# Patient Record
Sex: MALE | Race: WHITE | NOT HISPANIC OR LATINO | Employment: FULL TIME | URBAN - METROPOLITAN AREA
[De-identification: names, ages, dates, MRNs, and addresses within clinical notes are randomized per-mention and may not be internally consistent; named-entity substitution may affect disease eponyms.]

---

## 2019-07-02 ENCOUNTER — OFFICE VISIT (OUTPATIENT)
Dept: URGENT CARE | Facility: CLINIC | Age: 49
End: 2019-07-02
Payer: COMMERCIAL

## 2019-07-02 VITALS
TEMPERATURE: 98 F | SYSTOLIC BLOOD PRESSURE: 130 MMHG | DIASTOLIC BLOOD PRESSURE: 86 MMHG | HEIGHT: 69 IN | BODY MASS INDEX: 33.33 KG/M2 | HEART RATE: 73 BPM | WEIGHT: 225 LBS | OXYGEN SATURATION: 94 % | RESPIRATION RATE: 18 BRPM

## 2019-07-02 DIAGNOSIS — M54.50 ACUTE MIDLINE LOW BACK PAIN WITHOUT SCIATICA: ICD-10-CM

## 2019-07-02 DIAGNOSIS — S16.1XXA NECK STRAIN, INITIAL ENCOUNTER: Primary | ICD-10-CM

## 2019-07-02 PROCEDURE — 3008F PR BODY MASS INDEX (BMI) DOCUMENTED: ICD-10-PCS | Mod: CPTII,S$GLB,, | Performed by: NURSE PRACTITIONER

## 2019-07-02 PROCEDURE — 99213 OFFICE O/P EST LOW 20 MIN: CPT | Mod: S$GLB,,, | Performed by: NURSE PRACTITIONER

## 2019-07-02 PROCEDURE — 99213 PR OFFICE/OUTPT VISIT, EST, LEVL III, 20-29 MIN: ICD-10-PCS | Mod: S$GLB,,, | Performed by: NURSE PRACTITIONER

## 2019-07-02 PROCEDURE — 3008F BODY MASS INDEX DOCD: CPT | Mod: CPTII,S$GLB,, | Performed by: NURSE PRACTITIONER

## 2019-07-02 RX ORDER — AMLODIPINE AND BENAZEPRIL HYDROCHLORIDE 10; 20 MG/1; MG/1
CAPSULE ORAL
Refills: 2 | COMMUNITY
Start: 2019-06-03 | End: 2023-12-14 | Stop reason: SDUPTHER

## 2019-07-02 RX ORDER — DICLOFENAC SODIUM 75 MG/1
75 TABLET, DELAYED RELEASE ORAL 2 TIMES DAILY WITH MEALS
Qty: 60 TABLET | Refills: 0 | Status: SHIPPED | OUTPATIENT
Start: 2019-07-03 | End: 2020-07-02

## 2019-07-02 RX ORDER — METHOCARBAMOL 500 MG/1
500 TABLET, FILM COATED ORAL 4 TIMES DAILY
Qty: 30 TABLET | Refills: 0 | Status: SHIPPED | OUTPATIENT
Start: 2019-07-02 | End: 2019-07-12

## 2019-07-02 RX ORDER — MUPIROCIN 20 MG/G
OINTMENT TOPICAL
COMMUNITY
Start: 2018-05-07

## 2019-07-02 RX ORDER — CIPROFLOXACIN 500 MG/1
TABLET ORAL
Refills: 1 | COMMUNITY
Start: 2019-04-02

## 2019-07-02 RX ORDER — HYDROCHLOROTHIAZIDE 25 MG/1
TABLET ORAL
COMMUNITY
Start: 2019-04-04 | End: 2023-12-14 | Stop reason: SDUPTHER

## 2019-07-02 NOTE — PROGRESS NOTES
"Subjective:       Patient ID: Miguel Nieves is a 49 y.o. male.    Vitals:  height is 5' 9" (1.753 m) and weight is 102.1 kg (225 lb). His temperature is 97.7 °F (36.5 °C). His blood pressure is 130/86 and his pulse is 73. His respiration is 18 and oxygen saturation is 94% (abnormal).     Chief Complaint: Neck Pain (left)    Pt said he started having left side neck pain when he was talking at work. Back and shoulder pain stared a few days ago after weeding the lawn.     Neck Pain    This is a new problem. The current episode started today. The problem occurs constantly. The problem has been unchanged. The pain is associated with nothing. The pain is present in the left side. The quality of the pain is described as shooting. The pain is at a severity of 0/10. The patient is experiencing no pain. Nothing aggravates the symptoms. Pertinent negatives include no chest pain, fever or headaches. He has tried nothing for the symptoms.       Constitution: Negative for chills, fatigue and fever.   HENT: Negative for congestion and sore throat.    Neck: Positive for neck pain. Negative for painful lymph nodes.   Cardiovascular: Negative for chest pain and leg swelling.   Eyes: Negative for double vision and blurred vision.   Respiratory: Negative for cough and shortness of breath.    Gastrointestinal: Negative for nausea, vomiting and diarrhea.   Genitourinary: Negative for dysuria, frequency and urgency.   Musculoskeletal: Positive for pain. Negative for joint pain, joint swelling, muscle cramps and muscle ache.   Skin: Negative for color change, pale and rash.   Allergic/Immunologic: Negative for seasonal allergies.   Neurological: Negative for dizziness, history of vertigo, light-headedness, passing out and headaches.   Hematologic/Lymphatic: Negative for swollen lymph nodes, easy bruising/bleeding and history of blood clots. Does not bruise/bleed easily.   Psychiatric/Behavioral: Negative for nervous/anxious, sleep " disturbance and depression. The patient is not nervous/anxious.        Objective:      Physical Exam   Constitutional: He is oriented to person, place, and time. He appears well-developed and well-nourished. He is cooperative.  Non-toxic appearance. He does not appear ill. No distress.   HENT:   Head: Normocephalic and atraumatic.   Right Ear: Hearing, tympanic membrane, external ear and ear canal normal.   Left Ear: Hearing, tympanic membrane, external ear and ear canal normal.   Nose: Nose normal. No mucosal edema, rhinorrhea or nasal deformity. No epistaxis. Right sinus exhibits no maxillary sinus tenderness and no frontal sinus tenderness. Left sinus exhibits no maxillary sinus tenderness and no frontal sinus tenderness.   Mouth/Throat: Uvula is midline, oropharynx is clear and moist and mucous membranes are normal. No trismus in the jaw. Normal dentition. No uvula swelling. No posterior oropharyngeal erythema.   Eyes: Conjunctivae and lids are normal. Right eye exhibits no discharge. Left eye exhibits no discharge. No scleral icterus.   Sclera clear bilat   Neck: Trachea normal, normal range of motion, full passive range of motion without pain and phonation normal. Neck supple.   Cardiovascular: Normal rate, regular rhythm, normal heart sounds, intact distal pulses and normal pulses.   Pulmonary/Chest: Effort normal and breath sounds normal. No respiratory distress.   Abdominal: Soft. Normal appearance and bowel sounds are normal. He exhibits no distension, no pulsatile midline mass and no mass. There is no tenderness.   Musculoskeletal: He exhibits no edema or deformity.        Cervical back: He exhibits tenderness, pain and spasm. He exhibits no bony tenderness.        Lumbar back: He exhibits decreased range of motion, tenderness and pain. He exhibits no bony tenderness.   Neurological: He is alert and oriented to person, place, and time. He exhibits normal muscle tone. Coordination normal.   Skin: Skin is  warm, dry and intact. He is not diaphoretic. No pallor.   Psychiatric: He has a normal mood and affect. His speech is normal and behavior is normal. Judgment and thought content normal. Cognition and memory are normal.   Nursing note and vitals reviewed.      Assessment:       1. Neck strain, initial encounter    2. Acute midline low back pain without sciatica        Plan:         Neck strain, initial encounter  -     diclofenac (VOLTAREN) 75 MG EC tablet; Take 1 tablet (75 mg total) by mouth 2 (two) times daily with meals.  Dispense: 60 tablet; Refill: 0  -     methocarbamol (ROBAXIN) 500 MG Tab; Take 1 tablet (500 mg total) by mouth 4 (four) times daily. As needed for muscle spasm. May cause drowsiness for 10 days  Dispense: 30 tablet; Refill: 0    Acute midline low back pain without sciatica  -     diclofenac (VOLTAREN) 75 MG EC tablet; Take 1 tablet (75 mg total) by mouth 2 (two) times daily with meals.  Dispense: 60 tablet; Refill: 0  -     methocarbamol (ROBAXIN) 500 MG Tab; Take 1 tablet (500 mg total) by mouth 4 (four) times daily. As needed for muscle spasm. May cause drowsiness for 10 days  Dispense: 30 tablet; Refill: 0    Please follow up with your Primary care provider within 2-5 days if your signs and symptoms have not resolved or worsen.     If your condition worsens or fails to improve we recommend that you receive another evaluation at the emergency room immediately or contact your primary medical clinic to discuss your concerns.   You must understand that you have received an Urgent Care treatment only and that you may be released before all of your medical problems are known or treated. You, the patient, will arrange for follow up care as instructed.     RED FLAGS/WARNING SYMPTOMS DISCUSSED WITH PATIENT THAT WOULD WARRANT EMERGENT MEDICAL ATTENTION. PATIENT VERBALIZED UNDERSTANDING.       Back Pain (Acute or Chronic)    Back pain is one of the most common problems. The good news is that most  people feel better in 1 to 2 weeks, and most of the rest in 1 to 2 months. Most people can remain active.  People experience and describe pain differently; not everyone is the same.  · The pain can be sharp, stabbing, shooting, aching, cramping or burning.  · Movement, standing, bending, lifting, sitting, or walking may worsen pain.  · It can be localized to one spot or area, or it can be more generalized.  · It can spread or radiate upwards, to the front, or go down your arms or legs (sciatica).  · It can cause muscle spasm.  Most of the time, mechanical problems with the muscles or spine cause the pain. Mechanical problems are usually caused by an injury to the muscles or ligaments. While illness can cause back pain, it is usually not caused by a serious illness. Mechanical problems include:   · Physical activity such as sports, exercise, work, or normal activity  · Overexertion, lifting, pushing, pulling incorrectly or too aggressively  · Sudden twisting, bending, or stretching from an accident, or accidental movement  · Poor posture  · Stretching or moving wrong, without noticing pain at the time  · Poor coordination, lack of regular exercise (check with your doctor about this)  · Spinal disc disease or arthritis  · Stress  Pain can also be related to pregnancy, or illness like appendicitis, bladder or kidney infections, pelvic infections, and many other things.  Acute back pain usually gets better in 1 to 2 weeks. Back pain related to disk disease, arthritis in the spinal joints or spinal stenosis (narrowing of the spinal canal) can become chronic and last for months or years.  Unless you had a physical injury (for example, a car accident or fall) X-rays are usually not needed for the initial evaluation of back pain. If pain continues and does not respond to medical treatment, X-rays and other tests may be needed.  Home care  Try these home care recommendations:  · When in bed, try to find a position of  comfort. A firm mattress is best. Try lying flat on your back with pillows under your knees. You can also try lying on your side with your knees bent up towards your chest and a pillow between your knees.  · At first, do not try to stretch out the sore spots. If there is a strain, it is not like the good soreness you get after exercising without an injury. In this case, stretching may make it worse.  · Avoid prolong sitting, long car rides, or travel. This puts more stress on the lower back than standing or walking.  · During the first 24 to 72 hours after an acute injury or flare up of chronic back pain, apply an ice pack to the painful area for 20 minutes and then remove it for 20 minutes. Do this over a period of 60 to 90 minutes or several times a day. This will reduce swelling and pain. Wrap the ice pack in a thin towel or plastic to protect your skin.  · You can start with ice, then switch to heat. Heat (hot shower, hot bath, or heating pad) reduces pain and works well for muscle spasms. Heat can be applied to the painful area for 20 minutes then remove it for 20 minutes. Do this over a period of 60 to 90 minutes or several times a day. Do not sleep on a heating pad. It can lead to skin burns or tissue damage.  · You can alternate ice and heat therapy. Talk with your doctor about the best treatment for your back pain.  · Therapeutic massage can help relax the back muscles without stretching them.  · Be aware of safe lifting methods and do not lift anything without stretching first.  Medicines  Talk to your doctor before using medicine, especially if you have other medical problems or are taking other medicines.  · You may use over-the-counter medicine as directed on the bottle to control pain, unless another pain medicine was prescribed. If you have chronic conditions like diabetes, liver or kidney disease, stomach ulcers, or gastrointestinal bleeding, or are taking blood thinners, talk to your doctor before  taking any medicine.  · Be careful if you are given a prescription medicines, narcotics, or medicine for muscle spasms. They can cause drowsiness, affect your coordination, reflexes, and judgement. Do not drive or operate heavy machinery.  Follow-up care  Follow up with your healthcare provider, or as advised.   A radiologist will review any X-rays that were taken. Your provide will notify you of any new findings that may affect your care.  Call 911  Call emergency services if any of the following occur:  · Trouble breathing  · Confusion  · Very drowsy or trouble awakening  · Fainting or loss of consciousness  · Rapid or very slow heart rate  · Loss of bowel or bladder control  When to seek medical advice  Call your healthcare provider right away if any of these occur:   · Pain becomes worse or spreads to your legs  · Weakness or numbness in one or both legs  · Numbness in the groin or genital area  Date Last Reviewed: 7/1/2016  © 6042-6062 [employer]. [address] [place] [address]. All rights reserved. This information is not intended as a substitute for professional medical care. Always follow your healthcare professional's instructions.        Understanding Cervical Strain    There are 7 bones (vertebrae) in the neck that are part of the spine. These are called the cervical spine. Cervical strain is a medical term for neck pain. The neck has several layers of muscles. These are connected with tendons to the cervical spine and other bones. Neck pain is often the result of injury to these muscles and tendons.  Causes of cervical strain  Different types of stress on the neck can damage muscles and tendons (soft tissues) and cause cervical strain. Cervical tissues can be damaged by:  · The neck being forced past its normal range of motion, such as in a car accident or sports injury  · Constant, low-level stress, such as from poor posture or a poorly set-up workspace  Symptoms of cervical  strain  These may include:  · Neck pain or stiffness  · Pain in the shoulders or upper back  · Muscle spasms  · Headache, often starting at the base of the neck  · Irritability, difficulty concentrating, or sleeplessness  Treatment for cervical strain  This problem often gets better on its own. Treatments aim to reduce pain and inflammation and increase the range of motion of the neck. Possible treatments include:  · Over-the-counter or prescription pain medicine. These help relieve pain and inflammation.  · Stretching exercises to decrease neck stiffness.  · Massage to decrease neck stiffness.  · Cold or heat pack. These help reduce pain and swelling.  Call 911  Call emergency services right away if you have any of these:  · Face drooping or numbness  · Numbness or weakness, especially in the arms or on one side  · Slurred speech or difficulty speaking  · Blurred vision   When to call your healthcare provider  Call your healthcare provider right away if you have any of these:  · Fever of 100.4°F (38°C) or higher, or as directed  · Pain or stiffness that gets worse  · Symptoms that dont get better, or get worse  · Numbness, tingling, weakness or shooting pains into the arms or legs  · New symptoms  Date Last Reviewed: 3/10/2016  © 3990-6614 [employer]. [address] [place] [address], [place] [address]. All rights reserved. This information is not intended as a substitute for professional medical care. Always follow your healthcare professional's instructions.

## 2019-07-02 NOTE — PATIENT INSTRUCTIONS
Please follow up with your Primary care provider within 2-5 days if your signs and symptoms have not resolved or worsen.     If your condition worsens or fails to improve we recommend that you receive another evaluation at the emergency room immediately or contact your primary medical clinic to discuss your concerns.   You must understand that you have received an Urgent Care treatment only and that you may be released before all of your medical problems are known or treated. You, the patient, will arrange for follow up care as instructed.     RED FLAGS/WARNING SYMPTOMS DISCUSSED WITH PATIENT THAT WOULD WARRANT EMERGENT MEDICAL ATTENTION. PATIENT VERBALIZED UNDERSTANDING.       Back Pain (Acute or Chronic)    Back pain is one of the most common problems. The good news is that most people feel better in 1 to 2 weeks, and most of the rest in 1 to 2 months. Most people can remain active.  People experience and describe pain differently; not everyone is the same.  · The pain can be sharp, stabbing, shooting, aching, cramping or burning.  · Movement, standing, bending, lifting, sitting, or walking may worsen pain.  · It can be localized to one spot or area, or it can be more generalized.  · It can spread or radiate upwards, to the front, or go down your arms or legs (sciatica).  · It can cause muscle spasm.  Most of the time, mechanical problems with the muscles or spine cause the pain. Mechanical problems are usually caused by an injury to the muscles or ligaments. While illness can cause back pain, it is usually not caused by a serious illness. Mechanical problems include:   · Physical activity such as sports, exercise, work, or normal activity  · Overexertion, lifting, pushing, pulling incorrectly or too aggressively  · Sudden twisting, bending, or stretching from an accident, or accidental movement  · Poor posture  · Stretching or moving wrong, without noticing pain at the time  · Poor coordination, lack of regular  exercise (check with your doctor about this)  · Spinal disc disease or arthritis  · Stress  Pain can also be related to pregnancy, or illness like appendicitis, bladder or kidney infections, pelvic infections, and many other things.  Acute back pain usually gets better in 1 to 2 weeks. Back pain related to disk disease, arthritis in the spinal joints or spinal stenosis (narrowing of the spinal canal) can become chronic and last for months or years.  Unless you had a physical injury (for example, a car accident or fall) X-rays are usually not needed for the initial evaluation of back pain. If pain continues and does not respond to medical treatment, X-rays and other tests may be needed.  Home care  Try these home care recommendations:  · When in bed, try to find a position of comfort. A firm mattress is best. Try lying flat on your back with pillows under your knees. You can also try lying on your side with your knees bent up towards your chest and a pillow between your knees.  · At first, do not try to stretch out the sore spots. If there is a strain, it is not like the good soreness you get after exercising without an injury. In this case, stretching may make it worse.  · Avoid prolong sitting, long car rides, or travel. This puts more stress on the lower back than standing or walking.  · During the first 24 to 72 hours after an acute injury or flare up of chronic back pain, apply an ice pack to the painful area for 20 minutes and then remove it for 20 minutes. Do this over a period of 60 to 90 minutes or several times a day. This will reduce swelling and pain. Wrap the ice pack in a thin towel or plastic to protect your skin.  · You can start with ice, then switch to heat. Heat (hot shower, hot bath, or heating pad) reduces pain and works well for muscle spasms. Heat can be applied to the painful area for 20 minutes then remove it for 20 minutes. Do this over a period of 60 to 90 minutes or several times a day. Do  not sleep on a heating pad. It can lead to skin burns or tissue damage.  · You can alternate ice and heat therapy. Talk with your doctor about the best treatment for your back pain.  · Therapeutic massage can help relax the back muscles without stretching them.  · Be aware of safe lifting methods and do not lift anything without stretching first.  Medicines  Talk to your doctor before using medicine, especially if you have other medical problems or are taking other medicines.  · You may use over-the-counter medicine as directed on the bottle to control pain, unless another pain medicine was prescribed. If you have chronic conditions like diabetes, liver or kidney disease, stomach ulcers, or gastrointestinal bleeding, or are taking blood thinners, talk to your doctor before taking any medicine.  · Be careful if you are given a prescription medicines, narcotics, or medicine for muscle spasms. They can cause drowsiness, affect your coordination, reflexes, and judgement. Do not drive or operate heavy machinery.  Follow-up care  Follow up with your healthcare provider, or as advised.   A radiologist will review any X-rays that were taken. Your provide will notify you of any new findings that may affect your care.  Call 911  Call emergency services if any of the following occur:  · Trouble breathing  · Confusion  · Very drowsy or trouble awakening  · Fainting or loss of consciousness  · Rapid or very slow heart rate  · Loss of bowel or bladder control  When to seek medical advice  Call your healthcare provider right away if any of these occur:   · Pain becomes worse or spreads to your legs  · Weakness or numbness in one or both legs  · Numbness in the groin or genital area  Date Last Reviewed: 7/1/2016 © 2000-2017 [employer]. [address] [place] [address], [place] [address]. All rights reserved. This information is not intended as a substitute for professional medical care. Always follow your healthcare  professional's instructions.        Understanding Cervical Strain    There are 7 bones (vertebrae) in the neck that are part of the spine. These are called the cervical spine. Cervical strain is a medical term for neck pain. The neck has several layers of muscles. These are connected with tendons to the cervical spine and other bones. Neck pain is often the result of injury to these muscles and tendons.  Causes of cervical strain  Different types of stress on the neck can damage muscles and tendons (soft tissues) and cause cervical strain. Cervical tissues can be damaged by:  · The neck being forced past its normal range of motion, such as in a car accident or sports injury  · Constant, low-level stress, such as from poor posture or a poorly set-up workspace  Symptoms of cervical strain  These may include:  · Neck pain or stiffness  · Pain in the shoulders or upper back  · Muscle spasms  · Headache, often starting at the base of the neck  · Irritability, difficulty concentrating, or sleeplessness  Treatment for cervical strain  This problem often gets better on its own. Treatments aim to reduce pain and inflammation and increase the range of motion of the neck. Possible treatments include:  · Over-the-counter or prescription pain medicine. These help relieve pain and inflammation.  · Stretching exercises to decrease neck stiffness.  · Massage to decrease neck stiffness.  · Cold or heat pack. These help reduce pain and swelling.  Call 911  Call emergency services right away if you have any of these:  · Face drooping or numbness  · Numbness or weakness, especially in the arms or on one side  · Slurred speech or difficulty speaking  · Blurred vision   When to call your healthcare provider  Call your healthcare provider right away if you have any of these:  · Fever of 100.4°F (38°C) or higher, or as directed  · Pain or stiffness that gets worse  · Symptoms that dont get better, or get worse  · Numbness, tingling,  weakness or shooting pains into the arms or legs  · New symptoms  Date Last Reviewed: 3/10/2016  © 1052-7364 [employer]. [address] [place] [address], [place] [address]. All rights reserved. This information is not intended as a substitute for professional medical care. Always follow your healthcare professional's instructions.

## 2020-08-28 ENCOUNTER — OFFICE VISIT (OUTPATIENT)
Dept: URGENT CARE | Facility: CLINIC | Age: 50
End: 2020-08-28
Payer: COMMERCIAL

## 2020-08-28 VITALS
BODY MASS INDEX: 34.51 KG/M2 | HEART RATE: 94 BPM | WEIGHT: 233 LBS | RESPIRATION RATE: 15 BRPM | HEIGHT: 69 IN | SYSTOLIC BLOOD PRESSURE: 129 MMHG | DIASTOLIC BLOOD PRESSURE: 88 MMHG | TEMPERATURE: 99 F | OXYGEN SATURATION: 97 %

## 2020-08-28 DIAGNOSIS — J02.9 PHARYNGITIS, UNSPECIFIED ETIOLOGY: Primary | ICD-10-CM

## 2020-08-28 DIAGNOSIS — H65.193 ACUTE EFFUSION OF BOTH MIDDLE EARS: ICD-10-CM

## 2020-08-28 DIAGNOSIS — H92.03 OTALGIA OF BOTH EARS: ICD-10-CM

## 2020-08-28 LAB
CTP QC/QA: YES
SARS-COV-2 RDRP RESP QL NAA+PROBE: NEGATIVE

## 2020-08-28 PROCEDURE — 99214 OFFICE O/P EST MOD 30 MIN: CPT | Mod: S$GLB,,, | Performed by: PHYSICIAN ASSISTANT

## 2020-08-28 PROCEDURE — U0002 COVID-19 LAB TEST NON-CDC: HCPCS | Mod: S$GLB,,, | Performed by: PHYSICIAN ASSISTANT

## 2020-08-28 PROCEDURE — U0002: ICD-10-PCS | Mod: S$GLB,,, | Performed by: PHYSICIAN ASSISTANT

## 2020-08-28 PROCEDURE — 99214 PR OFFICE/OUTPT VISIT, EST, LEVL IV, 30-39 MIN: ICD-10-PCS | Mod: S$GLB,,, | Performed by: PHYSICIAN ASSISTANT

## 2020-08-28 RX ORDER — ROSUVASTATIN CALCIUM 10 MG/1
10 TABLET, COATED ORAL
COMMUNITY
Start: 2020-07-10

## 2020-08-28 NOTE — PATIENT INSTRUCTIONS
Self-Care for Sore Throats    Sore throats happen for many reasons, such as colds, allergies, and infections caused by viruses or bacteria. In any case, your throat becomes red and sore. Your goal for self-care is to reduce your discomfort while giving your throat a chance to heal.  Moisten and soothe your throat  Tips include the following:  · Try a sip of water first thing after waking up.  · Keep your throat moist by drinking 6 or more glasses of clear liquids every day.  · Run a cool-air humidifier in your room overnight.  · Avoid cigarette smoke.   · Suck on throat lozenges, cough drops, hard candy, ice chips, or frozen fruit-juice bars. Use the sugar-free versions if your diet or medical condition requires them.  Gargle to ease irritation  Gargling every hour or 2 can ease irritation. Try gargling with 1 of these solutions:  · 1/4 teaspoon of salt in 1/2 cup of warm water  · An over-the-counter anesthetic gargle  Use medicine for more relief  Over-the-counter medicine can reduce sore throat symptoms. Ask your pharmacist if you have questions about which medicine to use:  · Ease pain with anesthetic sprays. Aspirin or an aspirin substitute also helps. Remember, never give aspirin to anyone 18 or younger, or if you are already taking blood thinners.   · For sore throats caused by allergies, try antihistamines to block the allergic reaction.  · Remember: unless a sore throat is caused by a bacterial infection, antibiotics wont help you.  Prevent future sore throats  Prevention tips include the following:  · Stop smoking or reduce contact with secondhand smoke. Smoke irritates the tender throat lining.  · Limit contact with pets and with allergy-causing substances, such as pollen and mold.  · When youre around someone with a sore throat or cold, wash your hands often to keep viruses or bacteria from spreading.  · Dont strain your vocal cords.  Call your healthcare provider  Contact your healthcare provider if  you have:  · A temperature over 101°F (38.3°C)  · White spots on the throat  · Great difficulty swallowing  · Trouble breathing  · A skin rash  · Recent exposure to someone else with strep bacteria  · Severe hoarseness and swollen glands in the neck or jaw   Date Last Reviewed: 8/1/2016 © 2000-2017 [employer]. [address] [place] [address]. All rights reserved. This information is not intended as a substitute for professional medical care. Always follow your healthcare professional's instructions.      Patient Instructions   -Below are suggestions for symptomatic relief:              -Tylenol every 4 hours OR ibuprofen every 6 hours as needed for pain/fever.              -Salt water gargles to soothe throat pain.              -Chloroseptic spray also helps to numb throat pain.              -Nasal saline spray reduces inflammation and dryness.              -Warm face compresses to help with facial sinus pain/pressure.              -Vicks vapor rub at night.                           -Simple foods like chicken noodle soup.              -Delsym helps with coughing at night              -Zyrtec/Claritin during the day & Benadryl at night may help with allergies.                If you DO NOT have Hypertension or any history of palpitations, it is ok to take over the counter Sudafed or Mucinex D or Allegra-D or Claritin-D or Zyrtec-D.  If you do take one of the above, it is ok to combine that with plain over the counter Mucinex or Allegra or Claritin or Zyrtec. If, for example, you are taking Zyrtec -D, you can combine that with Mucinex, but not Mucinex-D.  If you are taking Mucinex-D, you can combine that with plain Allegra or Claritin or Zyrtec.   If you DO have Hypertension or palpitations, it is safe to take Coricidin HBP for relief of sinus symptoms.    Please follow up with your Primary care provider within 2-5 days if your signs and symptoms have not resolved or worsen.     If your  condition worsens or fails to improve we recommend that you receive another evaluation at the emergency room immediately or contact your primary medical clinic to discuss your concerns.   You must understand that you have received an Urgent Care treatment only and that you may be released before all of your medical problems are known or treated. You, the patient, will arrange for follow up care as instructed.     RED FLAGS/WARNING SYMPTOMS DISCUSSED WITH PATIENT THAT WOULD WARRANT EMERGENT MEDICAL ATTENTION. PATIENT VERBALIZED UNDERSTANDING.

## 2020-08-28 NOTE — PROGRESS NOTES
"Subjective:       Patient ID: Miguel Nieves is a 50 y.o. male.    Vitals:  height is 5' 9" (1.753 m) and weight is 105.7 kg (233 lb). His temperature is 99.3 °F (37.4 °C). His blood pressure is 129/88 and his pulse is 94. His respiration is 15 and oxygen saturation is 97%.     Chief Complaint: Otalgia    Patient has known COVID exposure with his wife who was tested 1 week ago.  He states that yesterday he started having pain in his left ear that switch to his right ear and now has mild sore throat.  Denies cough shortness of breath or chest pain.  Denies fever or malaise.  Denies body aches.  Denies loss of sense of taste or smell.    Otalgia   There is pain in both ears. This is a new problem. The current episode started yesterday. The problem occurs constantly. The problem has been unchanged. There has been no fever. The pain is at a severity of 2/10. The pain is mild. Associated symptoms include a sore throat. Pertinent negatives include no abdominal pain, coughing, diarrhea, ear discharge, headaches, hearing loss, neck pain, rash, rhinorrhea or vomiting. He has tried acetaminophen for the symptoms. The treatment provided mild relief.       Constitution: Negative for chills, sweating, fatigue and fever.   HENT: Positive for ear pain and sore throat. Negative for ear discharge, hearing loss, congestion, sinus pain, sinus pressure and voice change.    Neck: Negative for neck pain, neck stiffness and painful lymph nodes.   Eyes: Negative for eye redness.   Respiratory: Negative for chest tightness, cough, sputum production, bloody sputum, COPD, shortness of breath, stridor, wheezing and asthma.    Gastrointestinal: Negative for abdominal pain, nausea, vomiting and diarrhea.   Musculoskeletal: Negative for muscle ache.   Skin: Negative for rash.   Allergic/Immunologic: Negative for seasonal allergies and asthma.   Neurological: Negative for headaches.   Hematologic/Lymphatic: Negative for swollen lymph nodes.     "   Objective:      Physical Exam   Constitutional: He is oriented to person, place, and time. He appears well-developed. He is cooperative.  Non-toxic appearance. He does not appear ill. No distress.   HENT:   Head: Normocephalic and atraumatic.   Ears:   Right Ear: Hearing, external ear and ear canal normal. Tympanic membrane is injected.   Left Ear: Hearing, external ear and ear canal normal. Tympanic membrane is injected.   Nose: Nose normal. No mucosal edema, rhinorrhea or nasal deformity. No epistaxis. Right sinus exhibits no maxillary sinus tenderness and no frontal sinus tenderness. Left sinus exhibits no maxillary sinus tenderness and no frontal sinus tenderness.   Mouth/Throat: Uvula is midline and mucous membranes are normal. No trismus in the jaw. Normal dentition. No uvula swelling. Posterior oropharyngeal erythema present. No oropharyngeal exudate, posterior oropharyngeal edema, tonsillar abscesses or cobblestoning. Tonsils are 1+ on the right. Tonsils are 1+ on the left. No tonsillar exudate.   Eyes: Conjunctivae and lids are normal. No scleral icterus.   Neck: Trachea normal, full passive range of motion without pain and phonation normal. Neck supple. No neck rigidity. No edema and no erythema present.   Cardiovascular: Normal rate, regular rhythm, normal heart sounds and normal pulses.   Pulmonary/Chest: Effort normal and breath sounds normal. No respiratory distress. He has no decreased breath sounds. He has no rhonchi.   Abdominal: Normal appearance.   Musculoskeletal: Normal range of motion.         General: No deformity.   Neurological: He is alert and oriented to person, place, and time. He exhibits normal muscle tone. Coordination normal.   Skin: Skin is warm, dry, intact, not diaphoretic and not pale. Psychiatric: His speech is normal and behavior is normal. Judgment and thought content normal.   Nursing note and vitals reviewed.        Assessment:       1. Pharyngitis, unspecified etiology     2. Otalgia of both ears    3. Acute effusion of both middle ears        Plan:         Pharyngitis, unspecified etiology  -     POCT COVID-19 Rapid Screening    Otalgia of both ears  -     POCT COVID-19 Rapid Screening    Acute effusion of both middle ears     Reviewed lab results with patient. Discussed diagnosis with patient as well as treatment and home care. Discussed return to clinic precautions vs ER precautions. All patients questions answered. Patient verbalized understanding. Patient agreed with plan of care.         Self-Care for Sore Throats    Sore throats happen for many reasons, such as colds, allergies, and infections caused by viruses or bacteria. In any case, your throat becomes red and sore. Your goal for self-care is to reduce your discomfort while giving your throat a chance to heal.  Moisten and soothe your throat  Tips include the following:  · Try a sip of water first thing after waking up.  · Keep your throat moist by drinking 6 or more glasses of clear liquids every day.  · Run a cool-air humidifier in your room overnight.  · Avoid cigarette smoke.   · Suck on throat lozenges, cough drops, hard candy, ice chips, or frozen fruit-juice bars. Use the sugar-free versions if your diet or medical condition requires them.  Gargle to ease irritation  Gargling every hour or 2 can ease irritation. Try gargling with 1 of these solutions:  · 1/4 teaspoon of salt in 1/2 cup of warm water  · An over-the-counter anesthetic gargle  Use medicine for more relief  Over-the-counter medicine can reduce sore throat symptoms. Ask your pharmacist if you have questions about which medicine to use:  · Ease pain with anesthetic sprays. Aspirin or an aspirin substitute also helps. Remember, never give aspirin to anyone 18 or younger, or if you are already taking blood thinners.   · For sore throats caused by allergies, try antihistamines to block the allergic reaction.  · Remember: unless a sore throat is caused by a  bacterial infection, antibiotics wont help you.  Prevent future sore throats  Prevention tips include the following:  · Stop smoking or reduce contact with secondhand smoke. Smoke irritates the tender throat lining.  · Limit contact with pets and with allergy-causing substances, such as pollen and mold.  · When youre around someone with a sore throat or cold, wash your hands often to keep viruses or bacteria from spreading.  · Dont strain your vocal cords.  Call your healthcare provider  Contact your healthcare provider if you have:  · A temperature over 101°F (38.3°C)  · White spots on the throat  · Great difficulty swallowing  · Trouble breathing  · A skin rash  · Recent exposure to someone else with strep bacteria  · Severe hoarseness and swollen glands in the neck or jaw   Date Last Reviewed: 8/1/2016  © 6268-1604 [employer]. [address] [place] [address]. All rights reserved. This information is not intended as a substitute for professional medical care. Always follow your healthcare professional's instructions.      Patient Instructions   -Below are suggestions for symptomatic relief:              -Tylenol every 4 hours OR ibuprofen every 6 hours as needed for pain/fever.              -Salt water gargles to soothe throat pain.              -Chloroseptic spray also helps to numb throat pain.              -Nasal saline spray reduces inflammation and dryness.              -Warm face compresses to help with facial sinus pain/pressure.              -Vicks vapor rub at night.                           -Simple foods like chicken noodle soup.              -Delsym helps with coughing at night              -Zyrtec/Claritin during the day & Benadryl at night may help with allergies.                If you DO NOT have Hypertension or any history of palpitations, it is ok to take over the counter Sudafed or Mucinex D or Allegra-D or Claritin-D or Zyrtec-D.  If you do take one of the above, it  is ok to combine that with plain over the counter Mucinex or Allegra or Claritin or Zyrtec. If, for example, you are taking Zyrtec -D, you can combine that with Mucinex, but not Mucinex-D.  If you are taking Mucinex-D, you can combine that with plain Allegra or Claritin or Zyrtec.   If you DO have Hypertension or palpitations, it is safe to take Coricidin HBP for relief of sinus symptoms.    Please follow up with your Primary care provider within 2-5 days if your signs and symptoms have not resolved or worsen.     If your condition worsens or fails to improve we recommend that you receive another evaluation at the emergency room immediately or contact your primary medical clinic to discuss your concerns.   You must understand that you have received an Urgent Care treatment only and that you may be released before all of your medical problems are known or treated. You, the patient, will arrange for follow up care as instructed.     RED FLAGS/WARNING SYMPTOMS DISCUSSED WITH PATIENT THAT WOULD WARRANT EMERGENT MEDICAL ATTENTION. PATIENT VERBALIZED UNDERSTANDING.

## 2020-11-06 ENCOUNTER — OFFICE VISIT (OUTPATIENT)
Dept: URGENT CARE | Facility: CLINIC | Age: 50
End: 2020-11-06
Payer: COMMERCIAL

## 2020-11-06 VITALS
OXYGEN SATURATION: 97 % | HEART RATE: 84 BPM | TEMPERATURE: 98 F | WEIGHT: 235 LBS | BODY MASS INDEX: 34.8 KG/M2 | HEIGHT: 69 IN | SYSTOLIC BLOOD PRESSURE: 144 MMHG | RESPIRATION RATE: 18 BRPM | DIASTOLIC BLOOD PRESSURE: 97 MMHG

## 2020-11-06 DIAGNOSIS — K21.9 GASTROESOPHAGEAL REFLUX DISEASE, UNSPECIFIED WHETHER ESOPHAGITIS PRESENT: Primary | ICD-10-CM

## 2020-11-06 DIAGNOSIS — R12 HEARTBURN: ICD-10-CM

## 2020-11-06 DIAGNOSIS — R07.9 CHEST PAIN, UNSPECIFIED TYPE: ICD-10-CM

## 2020-11-06 PROCEDURE — 99214 OFFICE O/P EST MOD 30 MIN: CPT | Mod: S$GLB,,, | Performed by: PHYSICIAN ASSISTANT

## 2020-11-06 PROCEDURE — 93005 ELECTROCARDIOGRAM TRACING: CPT | Mod: S$GLB,,, | Performed by: PHYSICIAN ASSISTANT

## 2020-11-06 PROCEDURE — 99214 PR OFFICE/OUTPT VISIT, EST, LEVL IV, 30-39 MIN: ICD-10-PCS | Mod: S$GLB,,, | Performed by: PHYSICIAN ASSISTANT

## 2020-11-06 PROCEDURE — 93010 ELECTROCARDIOGRAM REPORT: CPT | Mod: S$GLB,,, | Performed by: INTERNAL MEDICINE

## 2020-11-06 PROCEDURE — 71046 XR CHEST PA AND LATERAL: ICD-10-PCS | Mod: FY,S$GLB,, | Performed by: RADIOLOGY

## 2020-11-06 PROCEDURE — 71046 X-RAY EXAM CHEST 2 VIEWS: CPT | Mod: FY,S$GLB,, | Performed by: RADIOLOGY

## 2020-11-06 PROCEDURE — 93005 EKG 12-LEAD: ICD-10-PCS | Mod: S$GLB,,, | Performed by: PHYSICIAN ASSISTANT

## 2020-11-06 PROCEDURE — 93010 EKG 12-LEAD: ICD-10-PCS | Mod: S$GLB,,, | Performed by: INTERNAL MEDICINE

## 2020-11-06 RX ORDER — CYCLOBENZAPRINE HCL 10 MG
TABLET ORAL
COMMUNITY
Start: 2020-09-12

## 2020-11-06 RX ORDER — PANTOPRAZOLE SODIUM 40 MG/1
40 TABLET, DELAYED RELEASE ORAL DAILY
Qty: 30 TABLET | Refills: 0 | Status: SHIPPED | OUTPATIENT
Start: 2020-11-06 | End: 2021-11-06

## 2020-11-07 NOTE — PROGRESS NOTES
"Subjective:       Patient ID: Miguel Nieves is a 50 y.o. male.    Vitals:  height is 5' 9" (1.753 m) and weight is 106.6 kg (235 lb). His oral temperature is 97.9 °F (36.6 °C). His blood pressure is 144/97 (abnormal) and his pulse is 84. His respiration is 18 and oxygen saturation is 97%.     Chief Complaint: Chest Pain    Mr. Nieves presents for evaluation of heartburn & chest pain.  He had a pain in his left chest on the lateral side starting around 6:30pm.  It is intermittent & kind of a cramping.  He is also having heartburn & belching.  The pain is not exertional.  He is not having nausea, vomiting, diaphoresis or SOB.  He reports belching several times, which did improve his pain.  He has a history of HTN & HLD, but no cardiac hx.  His father had CAD.  He is not a smoker & has never had a DVT/PE.  He did have cajun rice for lunch today.  He took a ada ASA with no change in symptoms.     Chest Pain   This is a new problem. The current episode started today. The onset quality is gradual. The problem occurs constantly. The problem has been unchanged. The pain is present in the lateral region. The pain is at a severity of 1/10. The pain is mild. Quality: cramp  Pertinent negatives include no abdominal pain, back pain, diaphoresis, dizziness, fever, headaches, nausea, palpitations, shortness of breath, syncope or vomiting. The pain is aggravated by nothing. Treatments tried: ada        Constitution: Negative for chills, sweating, fatigue and fever.   HENT: Negative for ear pain, ear discharge and trouble swallowing.    Neck: Negative for neck pain.   Cardiovascular: Positive for chest pain. Negative for chest trauma, leg swelling, palpitations, sob on exertion and passing out.   Respiratory: Negative for sleep apnea and shortness of breath.    Gastrointestinal: Positive for heartburn. Negative for abdominal pain, abdominal bloating, nausea, vomiting, constipation, diarrhea, bright red blood in stool, dark " colored stools, rectal bleeding, rectal pain and hemorrhoids.   Musculoskeletal: Negative for back pain and pain with walking.   Skin: Negative for rash.   Neurological: Negative for dizziness, history of vertigo, light-headedness, passing out, facial drooping, speech difficulty, coordination disturbances, loss of balance and headaches.   Hematologic/Lymphatic: Negative for history of blood clots.   Psychiatric/Behavioral: Negative for nervous/anxious. The patient is not nervous/anxious.        Objective:      Physical Exam   Constitutional: He is oriented to person, place, and time. He appears well-developed. He is cooperative.  Non-toxic appearance. He does not appear ill. No distress.   HENT:   Head: Normocephalic and atraumatic.   Ears:   Right Ear: Hearing, tympanic membrane, external ear and ear canal normal.   Left Ear: Hearing, tympanic membrane, external ear and ear canal normal.   Nose: Nose normal. No mucosal edema, rhinorrhea or nasal deformity. No epistaxis. Right sinus exhibits no maxillary sinus tenderness and no frontal sinus tenderness. Left sinus exhibits no maxillary sinus tenderness and no frontal sinus tenderness.   Mouth/Throat: Uvula is midline, oropharynx is clear and moist and mucous membranes are normal. No trismus in the jaw. Normal dentition. No uvula swelling. No posterior oropharyngeal erythema.   Eyes: Conjunctivae and lids are normal. Right eye exhibits no discharge. Left eye exhibits no discharge. No scleral icterus.   Neck: Trachea normal, normal range of motion, full passive range of motion without pain and phonation normal. Neck supple.   Cardiovascular: Normal rate, regular rhythm, normal heart sounds and normal pulses.   No murmur heard.   No systolic murmur is present.   No diastolic murmur is present.     Comments: BLE with no edema or TTP.   Pulmonary/Chest: Effort normal and breath sounds normal. No stridor. No respiratory distress. He has no decreased breath sounds. He has  no wheezes. He has no rhonchi. He has no rales.   No chest wall tenderness.     Comments: No chest wall tenderness.     Abdominal: Soft. Normal appearance and bowel sounds are normal. He exhibits no distension, no pulsatile midline mass and no mass. There is no abdominal tenderness.   Musculoskeletal: Normal range of motion.         General: No deformity.      Right lower leg: No edema.      Left lower leg: No edema.   Neurological: He is alert and oriented to person, place, and time. He exhibits normal muscle tone. Coordination normal.   Skin: Skin is warm, dry, intact, not diaphoretic and not pale. Psychiatric: His speech is normal and behavior is normal. Judgment and thought content normal.   Nursing note and vitals reviewed.      EKG - normal EKG with NSR at 76 BPM, no ST or T wave abn  CXR - No acute cardiopulmonary process identified.    Assessment:       1. Gastroesophageal reflux disease, unspecified whether esophagitis present    2. Chest pain, unspecified type    3. Heartburn        Plan:         Gastroesophageal reflux disease, unspecified whether esophagitis present    Chest pain, unspecified type  -     EKG 12-lead  -     XR CHEST PA AND LATERAL; Future; Expected date: 11/06/2020    Heartburn    Other orders  -     (pyxis) gi cocktail (mylanta 30 mL, lidocaine 2 % viscous 10 mL, dicyclomine 10 mL) 50 mL  -     pantoprazole (PROTONIX) 40 MG tablet; Take 1 tablet (40 mg total) by mouth once daily.  Dispense: 30 tablet; Refill: 0    50yoM with PMhx HTN & HLD who presents with a pain in his left chest with heartburn & belching.  EKG shows NSR & CXR clear.  PERC score 0. Clinical exam is WNL.  Patient was given a GI cocktail with relief of symptoms.  Strict ER precautions given.     Diagnoses and plan discussed with the patient, as well as the expected course and duration of his symptoms.  All questions and concerns were addressed prior to discharge.  He was advised to follow up with his PCP within 1 week if  symptoms do not improve.  Emergency department precautions were given.  Patient verbalized understanding and was happy with the plan of care.      Patient Instructions   PLEASE READ YOUR DISCHARGE INSTRUCTIONS ENTIRELY AS IT CONTAINS IMPORTANT INFORMATION.  - Rest.    - Drink plenty of fluids.    - Tylenol or Ibuprofen as directed as needed for fever/pain.    - If you were prescribed antibiotics, please take them to completion.  - If you are female and on birth control pills - please use additional methods of contraception to prevent pregnancy while on antibiotics and for one cycle after.   - If you were prescribed a narcotic medication or muscle relaxer, do not drive or operate heavy equipment or machinery while taking these medications, as they can cause drowsiness.   - If you smoke, please stop smoking.  -You must understand that you've received an Urgent Care treatment only and that you may be released before all your medical problems are known or treated. You, the patient, will    arrange for follow up care as instructed. Please arrange follow up with your primary medical clinic as soon as possible.   - Follow up with your PCP or specialty clinic as directed in the next 1-2 weeks if not improved or as needed.  You can call [phone] to schedule an appointment with the appropriate provider.    - Please return to Urgent Care or to the Emergency Department if your symptoms worsen.    Patient aware and verbalized understanding.    GERD (Adult)    The esophagus is a tube that carries food from the mouth to the stomach. A valve at the lower end of the esophagus prevents stomach acid from flowing upward. When this valve doesn't work properly, stomach contents may repeatedly flow back up (reflux) into the esophagus. This is called gastroesophageal reflux disease (GERD). GERD can irritate the esophagus. It can cause problems with swallowing or breathing. In severe cases, GERD can cause recurrent pneumonia or other  "serious problems.  Symptoms of reflux include burning, pressure or sharp pain in the upper abdomen or mid to lower chest. The pain can spread to the neck, back, or shoulder. There may be belching, an acid taste in the back of the throat, chronic cough, or sore throat or hoarseness. GERD symptoms often occur during the day after a big meal. They can also occur at night when lying down.   Home care  Lifestyle changes can help reduce symptoms. If needed, medicines may be prescribed. Symptoms often improve with treatment, but if treatment is stopped, the symptoms often return after a few months. So most persons with GERD will need to continue treatment.  Lifestyle changes  · Limit or avoid fatty, fried, and spicy foods, as well as coffee, chocolate, mint, and foods with high acid content such as tomatoes and citrus fruit and juices (orange, grapefruit, lemon).  · Dont eat large meals, especially at night. Frequent, smaller meals are best. Do not lie down right after eating. And dont eat anything 3 hours before going to bed.  · Avoid drinking alcohol and smoking. As much as possible, stay away from second hand smoke.  · If you are overweight, losing weight will reduce symptoms.   · Avoid wearing tight clothing around your stomach area.  · If your symptoms occur during sleep, use a foam wedge to elevate your upper body (not just your head.) Or, place 4" blocks under the head of your bed.  Medicines  If needed, medicines can help relieve the symptoms of GERD and prevent damage to the esophagus. Discuss a medicine plan with your healthcare provider. This may include one or more of the following medicines:  · Antacids to help neutralize the normal acids in your stomach.  · Acid blockers (H2 blockers) to decrease acid production.  · Acid inhibitors (PPIs) to decrease acid production in a different way than the blockers. They may work better, but can take a little longer to take effect.  Take an antacid 30-60 minutes after " eating and at bedtime, but not at the same time as an acid blocker.  Try not to take medicines such as ibuprofen and aspirin. If you are taking aspirin for your heart or other medical reasons, talk to your healthcare provider about stopping it.  Follow-up care  Follow up with your healthcare provider or as advised by our staff.  When to seek medical advice  Call your healthcare provider if any of the following occur:  · Stomach pain gets worse or moves to the lower right abdomen (appendix area)  · Chest pain appears or gets worse, or spreads to the back, neck, shoulder, or arm  · Frequent vomiting (cant keep down liquids)  · Blood in the stool or vomit (red or black in color)  · Feeling weak or dizzy  · Fever of 100.4ºF (38ºC) or higher, or as directed by your healthcare provider  Date Last Reviewed: 6/23/2015  © 7048-7579 [email or web address]. [address] [place] [address], [place] [address]. All rights reserved. This information is not intended as a substitute for professional medical care. Always follow your healthcare professional's instructions.

## 2020-11-07 NOTE — PATIENT INSTRUCTIONS
PLEASE READ YOUR DISCHARGE INSTRUCTIONS ENTIRELY AS IT CONTAINS IMPORTANT INFORMATION.  - Rest.    - Drink plenty of fluids.    - Tylenol or Ibuprofen as directed as needed for fever/pain.    - If you were prescribed antibiotics, please take them to completion.  - If you are female and on birth control pills - please use additional methods of contraception to prevent pregnancy while on antibiotics and for one cycle after.   - If you were prescribed a narcotic medication or muscle relaxer, do not drive or operate heavy equipment or machinery while taking these medications, as they can cause drowsiness.   - If you smoke, please stop smoking.  -You must understand that you've received an Urgent Care treatment only and that you may be released before all your medical problems are known or treated. You, the patient, will    arrange for follow up care as instructed. Please arrange follow up with your primary medical clinic as soon as possible.   - Follow up with your PCP or specialty clinic as directed in the next 1-2 weeks if not improved or as needed.  You can call [phone] to schedule an appointment with the appropriate provider.    - Please return to Urgent Care or to the Emergency Department if your symptoms worsen.    Patient aware and verbalized understanding.    GERD (Adult)    The esophagus is a tube that carries food from the mouth to the stomach. A valve at the lower end of the esophagus prevents stomach acid from flowing upward. When this valve doesn't work properly, stomach contents may repeatedly flow back up (reflux) into the esophagus. This is called gastroesophageal reflux disease (GERD). GERD can irritate the esophagus. It can cause problems with swallowing or breathing. In severe cases, GERD can cause recurrent pneumonia or other serious problems.  Symptoms of reflux include burning, pressure or sharp pain in the upper abdomen or mid to lower chest. The pain can spread to the neck, back, or  "shoulder. There may be belching, an acid taste in the back of the throat, chronic cough, or sore throat or hoarseness. GERD symptoms often occur during the day after a big meal. They can also occur at night when lying down.   Home care  Lifestyle changes can help reduce symptoms. If needed, medicines may be prescribed. Symptoms often improve with treatment, but if treatment is stopped, the symptoms often return after a few months. So most persons with GERD will need to continue treatment.  Lifestyle changes  · Limit or avoid fatty, fried, and spicy foods, as well as coffee, chocolate, mint, and foods with high acid content such as tomatoes and citrus fruit and juices (orange, grapefruit, lemon).  · Dont eat large meals, especially at night. Frequent, smaller meals are best. Do not lie down right after eating. And dont eat anything 3 hours before going to bed.  · Avoid drinking alcohol and smoking. As much as possible, stay away from second hand smoke.  · If you are overweight, losing weight will reduce symptoms.   · Avoid wearing tight clothing around your stomach area.  · If your symptoms occur during sleep, use a foam wedge to elevate your upper body (not just your head.) Or, place 4" blocks under the head of your bed.  Medicines  If needed, medicines can help relieve the symptoms of GERD and prevent damage to the esophagus. Discuss a medicine plan with your healthcare provider. This may include one or more of the following medicines:  · Antacids to help neutralize the normal acids in your stomach.  · Acid blockers (H2 blockers) to decrease acid production.  · Acid inhibitors (PPIs) to decrease acid production in a different way than the blockers. They may work better, but can take a little longer to take effect.  Take an antacid 30-60 minutes after eating and at bedtime, but not at the same time as an acid blocker.  Try not to take medicines such as ibuprofen and aspirin. If you are taking aspirin for your " heart or other medical reasons, talk to your healthcare provider about stopping it.  Follow-up care  Follow up with your healthcare provider or as advised by our staff.  When to seek medical advice  Call your healthcare provider if any of the following occur:  · Stomach pain gets worse or moves to the lower right abdomen (appendix area)  · Chest pain appears or gets worse, or spreads to the back, neck, shoulder, or arm  · Frequent vomiting (cant keep down liquids)  · Blood in the stool or vomit (red or black in color)  · Feeling weak or dizzy  · Fever of 100.4ºF (38ºC) or higher, or as directed by your healthcare provider  Date Last Reviewed: 6/23/2015  © 3811-2216 [employer]. [address] [place] [address], [place] [address]. All rights reserved. This information is not intended as a substitute for professional medical care. Always follow your healthcare professional's instructions.

## 2021-01-27 ENCOUNTER — TELEPHONE (OUTPATIENT)
Dept: PEDIATRICS | Facility: CLINIC | Age: 51
End: 2021-01-27

## 2021-01-27 DIAGNOSIS — Z20.822 EXPOSURE TO COVID-19 VIRUS: Primary | ICD-10-CM

## 2021-01-28 ENCOUNTER — LAB VISIT (OUTPATIENT)
Dept: INTERNAL MEDICINE | Facility: CLINIC | Age: 51
End: 2021-01-28
Payer: COMMERCIAL

## 2021-01-28 DIAGNOSIS — Z20.822 EXPOSURE TO COVID-19 VIRUS: Primary | ICD-10-CM

## 2021-01-28 PROCEDURE — U0003 INFECTIOUS AGENT DETECTION BY NUCLEIC ACID (DNA OR RNA); SEVERE ACUTE RESPIRATORY SYNDROME CORONAVIRUS 2 (SARS-COV-2) (CORONAVIRUS DISEASE [COVID-19]), AMPLIFIED PROBE TECHNIQUE, MAKING USE OF HIGH THROUGHPUT TECHNOLOGIES AS DESCRIBED BY CMS-2020-01-R: HCPCS

## 2021-01-29 LAB — SARS-COV-2 RNA RESP QL NAA+PROBE: NOT DETECTED

## 2021-03-26 ENCOUNTER — IMMUNIZATION (OUTPATIENT)
Dept: PRIMARY CARE CLINIC | Facility: CLINIC | Age: 51
End: 2021-03-26
Payer: COMMERCIAL

## 2021-03-26 DIAGNOSIS — Z23 NEED FOR VACCINATION: Primary | ICD-10-CM

## 2021-03-26 PROCEDURE — 91300 PR SARS-COV- 2 COVID-19 VACCINE, NO PRSV, 30MCG/0.3ML, IM: CPT | Mod: S$GLB,,, | Performed by: INTERNAL MEDICINE

## 2021-03-26 PROCEDURE — 91300 PR SARS-COV- 2 COVID-19 VACCINE, NO PRSV, 30MCG/0.3ML, IM: ICD-10-PCS | Mod: S$GLB,,, | Performed by: INTERNAL MEDICINE

## 2021-03-26 PROCEDURE — 0001A PR IMMUNIZ ADMIN, SARS-COV-2 COVID-19 VACC, 30MCG/0.3ML, 1ST DOSE: ICD-10-PCS | Mod: CV19,S$GLB,, | Performed by: INTERNAL MEDICINE

## 2021-03-26 PROCEDURE — 0001A PR IMMUNIZ ADMIN, SARS-COV-2 COVID-19 VACC, 30MCG/0.3ML, 1ST DOSE: CPT | Mod: CV19,S$GLB,, | Performed by: INTERNAL MEDICINE

## 2021-03-26 RX ADMIN — Medication 0.3 ML: at 02:03

## 2021-04-18 ENCOUNTER — IMMUNIZATION (OUTPATIENT)
Dept: PRIMARY CARE CLINIC | Facility: CLINIC | Age: 51
End: 2021-04-18
Payer: COMMERCIAL

## 2021-04-18 DIAGNOSIS — Z23 NEED FOR VACCINATION: Primary | ICD-10-CM

## 2021-04-18 PROCEDURE — 91300 PR SARS-COV- 2 COVID-19 VACCINE, NO PRSV, 30MCG/0.3ML, IM: CPT | Mod: S$GLB,,, | Performed by: INTERNAL MEDICINE

## 2021-04-18 PROCEDURE — 91300 PR SARS-COV- 2 COVID-19 VACCINE, NO PRSV, 30MCG/0.3ML, IM: ICD-10-PCS | Mod: S$GLB,,, | Performed by: INTERNAL MEDICINE

## 2021-04-18 PROCEDURE — 0002A PR IMMUNIZ ADMIN, SARS-COV-2 COVID-19 VACC, 30MCG/0.3ML, 2ND DOSE: ICD-10-PCS | Mod: CV19,S$GLB,, | Performed by: INTERNAL MEDICINE

## 2021-04-18 PROCEDURE — 0002A PR IMMUNIZ ADMIN, SARS-COV-2 COVID-19 VACC, 30MCG/0.3ML, 2ND DOSE: CPT | Mod: CV19,S$GLB,, | Performed by: INTERNAL MEDICINE

## 2021-04-18 RX ADMIN — Medication 0.3 ML: at 09:04

## 2021-07-12 ENCOUNTER — OFFICE VISIT (OUTPATIENT)
Dept: URGENT CARE | Facility: CLINIC | Age: 51
End: 2021-07-12
Payer: COMMERCIAL

## 2021-07-12 VITALS
BODY MASS INDEX: 32.44 KG/M2 | WEIGHT: 219 LBS | OXYGEN SATURATION: 97 % | RESPIRATION RATE: 13 BRPM | HEIGHT: 69 IN | DIASTOLIC BLOOD PRESSURE: 93 MMHG | SYSTOLIC BLOOD PRESSURE: 146 MMHG | TEMPERATURE: 98 F | HEART RATE: 68 BPM

## 2021-07-12 DIAGNOSIS — U07.1 COVID-19 VIRUS DETECTED: ICD-10-CM

## 2021-07-12 DIAGNOSIS — U07.1 COVID-19: ICD-10-CM

## 2021-07-12 DIAGNOSIS — R05.9 COUGH: Primary | ICD-10-CM

## 2021-07-12 LAB
CTP QC/QA: YES
SARS-COV-2 RDRP RESP QL NAA+PROBE: POSITIVE

## 2021-07-12 PROCEDURE — 3008F PR BODY MASS INDEX (BMI) DOCUMENTED: ICD-10-PCS | Mod: CPTII,S$GLB,, | Performed by: NURSE PRACTITIONER

## 2021-07-12 PROCEDURE — U0002: ICD-10-PCS | Mod: QW,CR,S$GLB, | Performed by: NURSE PRACTITIONER

## 2021-07-12 PROCEDURE — 99214 PR OFFICE/OUTPT VISIT, EST, LEVL IV, 30-39 MIN: ICD-10-PCS | Mod: S$GLB,,, | Performed by: NURSE PRACTITIONER

## 2021-07-12 PROCEDURE — 99214 OFFICE O/P EST MOD 30 MIN: CPT | Mod: S$GLB,,, | Performed by: NURSE PRACTITIONER

## 2021-07-12 PROCEDURE — U0002 COVID-19 LAB TEST NON-CDC: HCPCS | Mod: QW,CR,S$GLB, | Performed by: NURSE PRACTITIONER

## 2021-07-12 PROCEDURE — 3008F BODY MASS INDEX DOCD: CPT | Mod: CPTII,S$GLB,, | Performed by: NURSE PRACTITIONER

## 2021-07-13 ENCOUNTER — TELEPHONE (OUTPATIENT)
Dept: URGENT CARE | Facility: CLINIC | Age: 51
End: 2021-07-13

## 2021-10-30 ENCOUNTER — OFFICE VISIT (OUTPATIENT)
Dept: URGENT CARE | Facility: CLINIC | Age: 51
End: 2021-10-30
Payer: COMMERCIAL

## 2021-10-30 VITALS
SYSTOLIC BLOOD PRESSURE: 134 MMHG | OXYGEN SATURATION: 97 % | BODY MASS INDEX: 32.44 KG/M2 | HEART RATE: 78 BPM | WEIGHT: 219 LBS | HEIGHT: 69 IN | RESPIRATION RATE: 18 BRPM | DIASTOLIC BLOOD PRESSURE: 83 MMHG | TEMPERATURE: 99 F

## 2021-10-30 DIAGNOSIS — J00 NASOPHARYNGITIS: ICD-10-CM

## 2021-10-30 DIAGNOSIS — S16.1XXA NECK STRAIN, INITIAL ENCOUNTER: Primary | ICD-10-CM

## 2021-10-30 PROCEDURE — 3079F PR MOST RECENT DIASTOLIC BLOOD PRESSURE 80-89 MM HG: ICD-10-PCS | Mod: CPTII,S$GLB,, | Performed by: NURSE PRACTITIONER

## 2021-10-30 PROCEDURE — 3079F DIAST BP 80-89 MM HG: CPT | Mod: CPTII,S$GLB,, | Performed by: NURSE PRACTITIONER

## 2021-10-30 PROCEDURE — 3075F SYST BP GE 130 - 139MM HG: CPT | Mod: CPTII,S$GLB,, | Performed by: NURSE PRACTITIONER

## 2021-10-30 PROCEDURE — 1160F RVW MEDS BY RX/DR IN RCRD: CPT | Mod: CPTII,S$GLB,, | Performed by: NURSE PRACTITIONER

## 2021-10-30 PROCEDURE — 4010F PR ACE/ARB THEARPY RXD/TAKEN: ICD-10-PCS | Mod: CPTII,S$GLB,, | Performed by: NURSE PRACTITIONER

## 2021-10-30 PROCEDURE — 99213 PR OFFICE/OUTPT VISIT, EST, LEVL III, 20-29 MIN: ICD-10-PCS | Mod: S$GLB,,, | Performed by: NURSE PRACTITIONER

## 2021-10-30 PROCEDURE — 3008F BODY MASS INDEX DOCD: CPT | Mod: CPTII,S$GLB,, | Performed by: NURSE PRACTITIONER

## 2021-10-30 PROCEDURE — 1160F PR REVIEW ALL MEDS BY PRESCRIBER/CLIN PHARMACIST DOCUMENTED: ICD-10-PCS | Mod: CPTII,S$GLB,, | Performed by: NURSE PRACTITIONER

## 2021-10-30 PROCEDURE — 1159F MED LIST DOCD IN RCRD: CPT | Mod: CPTII,S$GLB,, | Performed by: NURSE PRACTITIONER

## 2021-10-30 PROCEDURE — 99213 OFFICE O/P EST LOW 20 MIN: CPT | Mod: S$GLB,,, | Performed by: NURSE PRACTITIONER

## 2021-10-30 PROCEDURE — 4010F ACE/ARB THERAPY RXD/TAKEN: CPT | Mod: CPTII,S$GLB,, | Performed by: NURSE PRACTITIONER

## 2021-10-30 PROCEDURE — 3008F PR BODY MASS INDEX (BMI) DOCUMENTED: ICD-10-PCS | Mod: CPTII,S$GLB,, | Performed by: NURSE PRACTITIONER

## 2021-10-30 PROCEDURE — 1159F PR MEDICATION LIST DOCUMENTED IN MEDICAL RECORD: ICD-10-PCS | Mod: CPTII,S$GLB,, | Performed by: NURSE PRACTITIONER

## 2021-10-30 PROCEDURE — 3075F PR MOST RECENT SYSTOLIC BLOOD PRESS GE 130-139MM HG: ICD-10-PCS | Mod: CPTII,S$GLB,, | Performed by: NURSE PRACTITIONER

## 2021-10-30 RX ORDER — CYCLOBENZAPRINE HCL 10 MG
10 TABLET ORAL NIGHTLY
Qty: 10 TABLET | Refills: 0 | Status: SHIPPED | OUTPATIENT
Start: 2021-10-30 | End: 2021-11-09

## 2021-10-30 RX ORDER — IBUPROFEN 600 MG/1
600 TABLET ORAL EVERY 6 HOURS PRN
Qty: 30 TABLET | Refills: 0 | Status: SHIPPED | OUTPATIENT
Start: 2021-10-30 | End: 2021-11-07

## 2022-02-22 ENCOUNTER — PATIENT MESSAGE (OUTPATIENT)
Dept: RESEARCH | Facility: HOSPITAL | Age: 52
End: 2022-02-22
Payer: COMMERCIAL

## 2022-03-10 ENCOUNTER — OFFICE VISIT (OUTPATIENT)
Dept: URGENT CARE | Facility: CLINIC | Age: 52
End: 2022-03-10
Payer: COMMERCIAL

## 2022-03-10 VITALS
HEART RATE: 77 BPM | WEIGHT: 226 LBS | OXYGEN SATURATION: 97 % | BODY MASS INDEX: 33.47 KG/M2 | RESPIRATION RATE: 16 BRPM | DIASTOLIC BLOOD PRESSURE: 101 MMHG | SYSTOLIC BLOOD PRESSURE: 147 MMHG | HEIGHT: 69 IN | TEMPERATURE: 99 F

## 2022-03-10 DIAGNOSIS — M79.18 MUSCULOSKELETAL PAIN: Primary | ICD-10-CM

## 2022-03-10 DIAGNOSIS — R11.0 NAUSEA: ICD-10-CM

## 2022-03-10 PROCEDURE — 99213 OFFICE O/P EST LOW 20 MIN: CPT | Mod: S$GLB,,, | Performed by: PHYSICIAN ASSISTANT

## 2022-03-10 PROCEDURE — 3080F PR MOST RECENT DIASTOLIC BLOOD PRESSURE >= 90 MM HG: ICD-10-PCS | Mod: CPTII,S$GLB,, | Performed by: PHYSICIAN ASSISTANT

## 2022-03-10 PROCEDURE — 1160F RVW MEDS BY RX/DR IN RCRD: CPT | Mod: CPTII,S$GLB,, | Performed by: PHYSICIAN ASSISTANT

## 2022-03-10 PROCEDURE — 3008F BODY MASS INDEX DOCD: CPT | Mod: CPTII,S$GLB,, | Performed by: PHYSICIAN ASSISTANT

## 2022-03-10 PROCEDURE — 1159F PR MEDICATION LIST DOCUMENTED IN MEDICAL RECORD: ICD-10-PCS | Mod: CPTII,S$GLB,, | Performed by: PHYSICIAN ASSISTANT

## 2022-03-10 PROCEDURE — 3080F DIAST BP >= 90 MM HG: CPT | Mod: CPTII,S$GLB,, | Performed by: PHYSICIAN ASSISTANT

## 2022-03-10 PROCEDURE — 3077F SYST BP >= 140 MM HG: CPT | Mod: CPTII,S$GLB,, | Performed by: PHYSICIAN ASSISTANT

## 2022-03-10 PROCEDURE — 1159F MED LIST DOCD IN RCRD: CPT | Mod: CPTII,S$GLB,, | Performed by: PHYSICIAN ASSISTANT

## 2022-03-10 PROCEDURE — 4010F ACE/ARB THERAPY RXD/TAKEN: CPT | Mod: CPTII,S$GLB,, | Performed by: PHYSICIAN ASSISTANT

## 2022-03-10 PROCEDURE — 3008F PR BODY MASS INDEX (BMI) DOCUMENTED: ICD-10-PCS | Mod: CPTII,S$GLB,, | Performed by: PHYSICIAN ASSISTANT

## 2022-03-10 PROCEDURE — 3077F PR MOST RECENT SYSTOLIC BLOOD PRESSURE >= 140 MM HG: ICD-10-PCS | Mod: CPTII,S$GLB,, | Performed by: PHYSICIAN ASSISTANT

## 2022-03-10 PROCEDURE — 99213 PR OFFICE/OUTPT VISIT, EST, LEVL III, 20-29 MIN: ICD-10-PCS | Mod: S$GLB,,, | Performed by: PHYSICIAN ASSISTANT

## 2022-03-10 PROCEDURE — 4010F PR ACE/ARB THEARPY RXD/TAKEN: ICD-10-PCS | Mod: CPTII,S$GLB,, | Performed by: PHYSICIAN ASSISTANT

## 2022-03-10 PROCEDURE — 1160F PR REVIEW ALL MEDS BY PRESCRIBER/CLIN PHARMACIST DOCUMENTED: ICD-10-PCS | Mod: CPTII,S$GLB,, | Performed by: PHYSICIAN ASSISTANT

## 2022-03-10 RX ORDER — CYCLOBENZAPRINE HCL 10 MG
10 TABLET ORAL 3 TIMES DAILY PRN
Qty: 30 TABLET | Refills: 0 | Status: SHIPPED | OUTPATIENT
Start: 2022-03-10 | End: 2022-03-20

## 2022-03-10 RX ORDER — ONDANSETRON 4 MG/1
4 TABLET, ORALLY DISINTEGRATING ORAL EVERY 8 HOURS PRN
Qty: 15 TABLET | Refills: 0 | Status: SHIPPED | OUTPATIENT
Start: 2022-03-10

## 2022-03-10 NOTE — PROGRESS NOTES
"Subjective:       Patient ID: Miguel Nieves is a 52 y.o. male.    Vitals:  height is 5' 9" (1.753 m) and weight is 102.5 kg (226 lb). His temperature is 98.5 °F (36.9 °C). His blood pressure is 147/101 (abnormal) and his pulse is 77. His respiration is 16 and oxygen saturation is 97%.     Chief Complaint: Emesis    Pt presents to the clinic with a c/o vomiting today. Pt states he got really hot and sweaty and vomited.  Paste that he woke up with some mild neck pain on the right side concerned he may have slept wrong.  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  He gagged but no emesis.    Emesis   This is a new problem. The current episode started today. The problem occurs less than 2 times per day. The problem has been unchanged. The emesis has an appearance of stomach contents. There has been no fever. Associated symptoms include chills and sweats. Associated symptoms comments: Sharp neck pain . He has tried nothing for the symptoms. The treatment provided no relief.       Constitution: Positive for chills.   Gastrointestinal: Positive for vomiting.   Skin: Negative for erythema.       Objective:      Physical Exam   Constitutional: He is oriented to person, place, and time. He appears well-developed. He is cooperative.  Non-toxic appearance. He does not appear ill. No distress.   HENT:   Head: Normocephalic and atraumatic.   Ears:   Right Ear: Hearing, tympanic membrane, external ear and ear canal normal.   Left Ear: Hearing, tympanic membrane, external ear and ear canal normal.   Nose: Nose normal. No mucosal edema, rhinorrhea, nasal deformity or congestion. No epistaxis. Right sinus exhibits no maxillary sinus tenderness and no frontal sinus tenderness. Left sinus exhibits no maxillary sinus tenderness and no frontal sinus tenderness.   Mouth/Throat: Uvula is midline, " oropharynx is clear and moist and mucous membranes are normal. No trismus in the jaw. Normal dentition. No uvula swelling. No oropharyngeal exudate, posterior oropharyngeal edema or posterior oropharyngeal erythema.   Eyes: Conjunctivae and lids are normal. Pupils are equal, round, and reactive to light. Right eye exhibits no discharge. Left eye exhibits no discharge. No scleral icterus.      extraocular movement intact   Neck: Trachea normal and phonation normal. Neck supple.     No edema present. No erythema present. No neck rigidity present.   Cardiovascular: Normal rate, regular rhythm, normal heart sounds and normal pulses.   Pulmonary/Chest: Effort normal and breath sounds normal. No respiratory distress. He has no decreased breath sounds. He has no wheezes. He has no rhonchi.   Abdominal: Normal appearance. Soft.   Musculoskeletal: Normal range of motion.         General: No deformity. Normal range of motion.   Lymphadenopathy:     He has no cervical adenopathy.   Neurological: He is alert and oriented to person, place, and time. He exhibits normal muscle tone. Coordination normal.   Skin: Skin is warm, dry, intact, not diaphoretic and not pale. Capillary refill takes less than 2 seconds. No bruising and No erythema   Psychiatric: His speech is normal and behavior is normal. Judgment and thought content normal.   Nursing note and vitals reviewed.        Assessment:       1. Musculoskeletal pain    2. Nausea          Plan:         Musculoskeletal pain  -     cyclobenzaprine (FLEXERIL) 10 MG tablet; Take 1 tablet (10 mg total) by mouth 3 (three) times daily as needed.  Dispense: 30 tablet; Refill: 0    Nausea  -     ondansetron (ZOFRAN-ODT) 4 MG TbDL; Take 1 tablet (4 mg total) by mouth every 8 (eight) hours as needed (Nausea).  Dispense: 15 tablet; Refill: 0    Follow up if symptoms worsen or fail to improve, for F/U with PCP or ED. There are no Patient Instructions on file for this visit.

## 2022-04-11 ENCOUNTER — OFFICE VISIT (OUTPATIENT)
Dept: URGENT CARE | Facility: CLINIC | Age: 52
End: 2022-04-11
Payer: COMMERCIAL

## 2022-04-11 VITALS
SYSTOLIC BLOOD PRESSURE: 139 MMHG | OXYGEN SATURATION: 96 % | WEIGHT: 226 LBS | BODY MASS INDEX: 33.47 KG/M2 | RESPIRATION RATE: 18 BRPM | HEART RATE: 93 BPM | TEMPERATURE: 99 F | HEIGHT: 69 IN | DIASTOLIC BLOOD PRESSURE: 95 MMHG

## 2022-04-11 DIAGNOSIS — J06.9 URI WITH COUGH AND CONGESTION: Primary | ICD-10-CM

## 2022-04-11 LAB
CTP QC/QA: YES
CTP QC/QA: YES
POC MOLECULAR INFLUENZA A AGN: NEGATIVE
POC MOLECULAR INFLUENZA B AGN: NEGATIVE
SARS-COV-2 RDRP RESP QL NAA+PROBE: NEGATIVE

## 2022-04-11 PROCEDURE — 3008F BODY MASS INDEX DOCD: CPT | Mod: CPTII,S$GLB,, | Performed by: NURSE PRACTITIONER

## 2022-04-11 PROCEDURE — 3075F SYST BP GE 130 - 139MM HG: CPT | Mod: CPTII,S$GLB,, | Performed by: NURSE PRACTITIONER

## 2022-04-11 PROCEDURE — 99213 PR OFFICE/OUTPT VISIT, EST, LEVL III, 20-29 MIN: ICD-10-PCS | Mod: S$GLB,,, | Performed by: NURSE PRACTITIONER

## 2022-04-11 PROCEDURE — U0002 COVID-19 LAB TEST NON-CDC: HCPCS | Mod: QW,S$GLB,, | Performed by: NURSE PRACTITIONER

## 2022-04-11 PROCEDURE — 3075F PR MOST RECENT SYSTOLIC BLOOD PRESS GE 130-139MM HG: ICD-10-PCS | Mod: CPTII,S$GLB,, | Performed by: NURSE PRACTITIONER

## 2022-04-11 PROCEDURE — 71046 X-RAY EXAM CHEST 2 VIEWS: CPT | Mod: FY,S$GLB,, | Performed by: RADIOLOGY

## 2022-04-11 PROCEDURE — 1159F PR MEDICATION LIST DOCUMENTED IN MEDICAL RECORD: ICD-10-PCS | Mod: CPTII,S$GLB,, | Performed by: NURSE PRACTITIONER

## 2022-04-11 PROCEDURE — 99213 OFFICE O/P EST LOW 20 MIN: CPT | Mod: S$GLB,,, | Performed by: NURSE PRACTITIONER

## 2022-04-11 PROCEDURE — 3080F PR MOST RECENT DIASTOLIC BLOOD PRESSURE >= 90 MM HG: ICD-10-PCS | Mod: CPTII,S$GLB,, | Performed by: NURSE PRACTITIONER

## 2022-04-11 PROCEDURE — 87502 POCT INFLUENZA A/B MOLECULAR: ICD-10-PCS | Mod: QW,S$GLB,, | Performed by: NURSE PRACTITIONER

## 2022-04-11 PROCEDURE — 71046 XR CHEST PA AND LATERAL: ICD-10-PCS | Mod: FY,S$GLB,, | Performed by: RADIOLOGY

## 2022-04-11 PROCEDURE — 4010F ACE/ARB THERAPY RXD/TAKEN: CPT | Mod: CPTII,S$GLB,, | Performed by: NURSE PRACTITIONER

## 2022-04-11 PROCEDURE — 3080F DIAST BP >= 90 MM HG: CPT | Mod: CPTII,S$GLB,, | Performed by: NURSE PRACTITIONER

## 2022-04-11 PROCEDURE — U0002: ICD-10-PCS | Mod: QW,S$GLB,, | Performed by: NURSE PRACTITIONER

## 2022-04-11 PROCEDURE — 1160F RVW MEDS BY RX/DR IN RCRD: CPT | Mod: CPTII,S$GLB,, | Performed by: NURSE PRACTITIONER

## 2022-04-11 PROCEDURE — 3008F PR BODY MASS INDEX (BMI) DOCUMENTED: ICD-10-PCS | Mod: CPTII,S$GLB,, | Performed by: NURSE PRACTITIONER

## 2022-04-11 PROCEDURE — 87502 INFLUENZA DNA AMP PROBE: CPT | Mod: QW,S$GLB,, | Performed by: NURSE PRACTITIONER

## 2022-04-11 PROCEDURE — 1160F PR REVIEW ALL MEDS BY PRESCRIBER/CLIN PHARMACIST DOCUMENTED: ICD-10-PCS | Mod: CPTII,S$GLB,, | Performed by: NURSE PRACTITIONER

## 2022-04-11 PROCEDURE — 4010F PR ACE/ARB THEARPY RXD/TAKEN: ICD-10-PCS | Mod: CPTII,S$GLB,, | Performed by: NURSE PRACTITIONER

## 2022-04-11 PROCEDURE — 1159F MED LIST DOCD IN RCRD: CPT | Mod: CPTII,S$GLB,, | Performed by: NURSE PRACTITIONER

## 2022-04-11 RX ORDER — SCOLOPAMINE TRANSDERMAL SYSTEM 1 MG/1
1 PATCH, EXTENDED RELEASE TRANSDERMAL
COMMUNITY
Start: 2022-02-24 | End: 2023-02-24

## 2022-04-11 RX ORDER — TRIAMCINOLONE ACETONIDE 1 MG/G
CREAM TOPICAL
COMMUNITY
Start: 2021-10-20

## 2022-04-11 RX ORDER — BENZONATATE 100 MG/1
100 CAPSULE ORAL 3 TIMES DAILY PRN
Qty: 30 CAPSULE | Refills: 0 | Status: SHIPPED | OUTPATIENT
Start: 2022-04-11 | End: 2022-04-21

## 2022-04-11 RX ORDER — SOD SULF/POT CHLORIDE/MAG SULF 1.479 G
TABLET ORAL
COMMUNITY
Start: 2021-10-29

## 2022-04-11 NOTE — PROGRESS NOTES
"Subjective:       Patient ID: Miguel Nieves is a 52 y.o. male.    Vitals:  height is 5' 9" (1.753 m) and weight is 102.5 kg (226 lb). His oral temperature is 98.5 °F (36.9 °C). His blood pressure is 139/95 (abnormal) and his pulse is 93. His respiration is 18 and oxygen saturation is 96%.     Chief Complaint: Cough    Pt presents to urgent care for dry hacking cough x 3-4 days with an Sore itchy throat & SOB.  Pt has been taking robitusson & inhaler for symptoms  Provider note below:  This is a 52 y.o. male who presents today with a chief complaint of dry cough for 3-4 days with sore throat, patient reports while shortness of breath while coughing, patient reports does have history of bronchitis and takes inhalers as needed, patient reports he started taking Robitussin last night which helped for cough, patient reports he does take medication for his blood pressure but yesterday he ate crawfish, denies fever, body aches or chills, denies  wheezing, denies nausea, vomiting, diarrhea or abdominal pain, denies chest pain or dizziness positional lightheadedness, denies  trouble swallowing, denies loss of taste or smell, or any other symptoms      Cough  This is a new problem. The current episode started in the past 7 days. The problem has been gradually worsening. The problem occurs constantly. The cough is non-productive. Associated symptoms include nasal congestion, postnasal drip, a sore throat and shortness of breath (with cough). Pertinent negatives include no chest pain, chills, fever or heartburn. Nothing aggravates the symptoms. He has tried steroid inhaler and OTC cough suppressant for the symptoms. His past medical history is significant for bronchiectasis.       Constitution: Negative for chills, sweating, fatigue and fever.   HENT: Positive for postnasal drip and sore throat.    Cardiovascular: Negative for chest pain.   Respiratory: Positive for cough, sputum production and shortness of breath (with " cough).    Gastrointestinal: Negative for abdominal pain, nausea, vomiting, constipation, diarrhea and heartburn.   Neurological: Negative for dizziness and light-headedness.       Objective:      Physical Exam   Constitutional: He is oriented to person, place, and time. He appears well-developed. He is cooperative.  Non-toxic appearance. He does not appear ill. No distress.   HENT:   Head: Normocephalic and atraumatic.   Ears:   Right Ear: Hearing, tympanic membrane, external ear and ear canal normal.   Left Ear: Hearing, tympanic membrane, external ear and ear canal normal.   Nose: Nose normal. No mucosal edema, rhinorrhea or nasal deformity. No epistaxis. Right sinus exhibits no maxillary sinus tenderness and no frontal sinus tenderness. Left sinus exhibits no maxillary sinus tenderness and no frontal sinus tenderness.   Mouth/Throat: Uvula is midline, oropharynx is clear and moist and mucous membranes are normal. No trismus in the jaw. Normal dentition. No uvula swelling. No oropharyngeal exudate, posterior oropharyngeal edema, posterior oropharyngeal erythema, tonsillar abscesses or cobblestoning.   Eyes: Conjunctivae and lids are normal. No scleral icterus.   Neck: Trachea normal and phonation normal. Neck supple. No edema present. No erythema present. No neck rigidity present.   Cardiovascular: Normal rate, regular rhythm, normal heart sounds and normal pulses.   Pulmonary/Chest: Effort normal and breath sounds normal. No stridor. No respiratory distress. He has no decreased breath sounds. He has no wheezes. He has no rhonchi. He has no rales.   Abdominal: Normal appearance.   Musculoskeletal: Normal range of motion.         General: No deformity. Normal range of motion.   Neurological: He is alert and oriented to person, place, and time. He exhibits normal muscle tone. Coordination normal.   Skin: Skin is warm, dry, intact, not diaphoretic and not pale.   Psychiatric: His speech is normal and behavior is  normal. Judgment and thought content normal.   Nursing note and vitals reviewed.    Results for orders placed or performed in visit on 04/11/22   POCT COVID-19 Rapid Screening   Result Value Ref Range    POC Rapid COVID Negative Negative     Acceptable Yes    POCT Influenza A/B MOLECULAR   Result Value Ref Range    POC Molecular Influenza A Ag Negative Negative, Not Reported    POC Molecular Influenza B Ag Negative Negative, Not Reported     Acceptable Yes          X-Ray Chest PA And Lateral    Result Date: 4/11/2022  EXAMINATION: XR CHEST PA AND LATERAL CLINICAL HISTORY: Cough, unspecified TECHNIQUE: PA and lateral views of the chest were performed. COMPARISON: 11/06/2020 FINDINGS: The trachea is unremarkable.  The cardiomediastinal silhouette is within normal limits.  The hemidiaphragms are unremarkable.  There are no pleural effusions.  There is no evidence of a pneumothorax.  There is no evidence of pneumomediastinum.  No airspace opacity is present.  There are degenerative changes in the osseous structures.     No acute process. Electronically signed by: Dheeraj Valenzuela MD Date:    04/11/2022 Time:    08:46      Patient in no acute distress.  Vitals reassuring.  Discussed results/diagnosis/plan in depth with patient in clinic. Strict precautions given to patient to monitor for worsening signs and symptoms. Advised to follow up with primary.All questions answered. Strict ER precautions given. If your symptoms worsens or fail to improve you should go to the Emergency Room. Discharge and follow-up instructions given verbally/printed. Discharge and follow-up instructions discussed with the patient who expressed understanding and willingness to comply with my recommendations.Patient voiced understanding and in agreement with current treatment plan.     Please be advised this text was dictated with [employer] software and may contain errors due to translation.      Assessment:       1. URI  with cough and congestion          Plan:         URI with cough and congestion  -     POCT COVID-19 Rapid Screening  -     POCT Influenza A/B MOLECULAR  -     X-Ray Chest PA And Lateral; Future; Expected date: 04/11/2022    Other orders  -     benzonatate (TESSALON) 100 MG capsule; Take 1 capsule (100 mg total) by mouth 3 (three) times daily as needed for Cough.  Dispense: 30 capsule; Refill: 0           Medical Decision Making:   Differential Diagnosis:   Differential diagnoses included but not limited to acute URI, allergic or seasonal rhinitis, viral pharyngitis, bacterial pharyngitis or tonsillitis, tonsillitis, influenza, acute bacterial rhinosinusitis, Bronchitis  Clinical Tests:   Lab Tests: Reviewed  Radiological Study: Ordered and Reviewed  Urgent Care Management:  Patient in no acute distress.  Vitals reassuring.  Negative COVID-19 and flu test results discussed with patient in detail.  Chest x-ray results discussed with patient in depth.  Physical examination, lungs CTA.  Medication prescribed over-the-counter medications discussed with patient in depth. Patient voiced understanding and in agreement with current treatment plan.         Patient Instructions   PLEASE READ YOUR DISCHARGE INSTRUCTIONS ENTIRELY AS IT CONTAINS IMPORTANT INFORMATION.      Please drink plenty of fluids.    Please get plenty of rest.    Please return here or go to the Emergency Department for any concerns or worsening of condition.    Please take an over the counter antihistamine medication (allegra/Claritin/Zyrtec) of your choice as directed.    Try an over the counter decongestant like Mucinex D or Sudafed. You buy this behind the pharmacy counter    If you do have Hypertension or palpitations, it is safe to take Coricidin HBP for relief of sinus symptoms.    If not allergic, please take over the counter Tylenol (Acetaminophen) and/or Motrin (Ibuprofen) as directed for control of pain and/or fever.  Please follow up with your  primary care doctor or specialist as needed.    Sore throat recommendations: Warm fluids, warm salt water gargles, throat lozenges, tea, honey, soup, rest, hydration.    Use over the counter flonase: one spray each nostril twice daily OR two sprays each nostril once daily.     If you  smoke, please stop smoking.      Please return or see your primary care doctor if you develop new or worsening symptoms.     Please arrange follow up with your primary medical clinic as soon as possible. You must understand that you've received an Urgent Care treatment only and that you may be released before all of your medical problems are known or treated. You, the patient, will arrange for follow up as instructed. If your symptoms worsen or fail to improve you should go to the Emergency Room.

## 2022-12-01 ENCOUNTER — OFFICE VISIT (OUTPATIENT)
Dept: URGENT CARE | Facility: CLINIC | Age: 52
End: 2022-12-01
Payer: COMMERCIAL

## 2022-12-01 VITALS
RESPIRATION RATE: 20 BRPM | DIASTOLIC BLOOD PRESSURE: 100 MMHG | HEART RATE: 80 BPM | SYSTOLIC BLOOD PRESSURE: 146 MMHG | BODY MASS INDEX: 33.37 KG/M2 | OXYGEN SATURATION: 98 % | TEMPERATURE: 98 F | WEIGHT: 226 LBS

## 2022-12-01 DIAGNOSIS — J02.9 SORE THROAT: Primary | ICD-10-CM

## 2022-12-01 DIAGNOSIS — J02.9 ACUTE PHARYNGITIS, UNSPECIFIED ETIOLOGY: ICD-10-CM

## 2022-12-01 LAB
CTP QC/QA: YES
CTP QC/QA: YES
MOLECULAR STREP A: NEGATIVE
SARS-COV-2 AG RESP QL IA.RAPID: NEGATIVE

## 2022-12-01 PROCEDURE — 87651 POCT STREP A MOLECULAR: ICD-10-PCS | Mod: QW,S$GLB,, | Performed by: FAMILY MEDICINE

## 2022-12-01 PROCEDURE — 3077F SYST BP >= 140 MM HG: CPT | Mod: CPTII,S$GLB,, | Performed by: FAMILY MEDICINE

## 2022-12-01 PROCEDURE — 4010F PR ACE/ARB THEARPY RXD/TAKEN: ICD-10-PCS | Mod: CPTII,S$GLB,, | Performed by: FAMILY MEDICINE

## 2022-12-01 PROCEDURE — U0002 COVID-19 LAB TEST NON-CDC: HCPCS | Mod: QW,S$GLB,, | Performed by: FAMILY MEDICINE

## 2022-12-01 PROCEDURE — 3080F PR MOST RECENT DIASTOLIC BLOOD PRESSURE >= 90 MM HG: ICD-10-PCS | Mod: CPTII,S$GLB,, | Performed by: FAMILY MEDICINE

## 2022-12-01 PROCEDURE — U0002 SARS CORONAVIRUS 2 ANTIGEN POCT, MANUAL READ: ICD-10-PCS | Mod: QW,S$GLB,, | Performed by: FAMILY MEDICINE

## 2022-12-01 PROCEDURE — 99213 PR OFFICE/OUTPT VISIT, EST, LEVL III, 20-29 MIN: ICD-10-PCS | Mod: S$GLB,,, | Performed by: FAMILY MEDICINE

## 2022-12-01 PROCEDURE — 1159F PR MEDICATION LIST DOCUMENTED IN MEDICAL RECORD: ICD-10-PCS | Mod: CPTII,S$GLB,, | Performed by: FAMILY MEDICINE

## 2022-12-01 PROCEDURE — 1159F MED LIST DOCD IN RCRD: CPT | Mod: CPTII,S$GLB,, | Performed by: FAMILY MEDICINE

## 2022-12-01 PROCEDURE — 3008F PR BODY MASS INDEX (BMI) DOCUMENTED: ICD-10-PCS | Mod: CPTII,S$GLB,, | Performed by: FAMILY MEDICINE

## 2022-12-01 PROCEDURE — 99213 OFFICE O/P EST LOW 20 MIN: CPT | Mod: S$GLB,,, | Performed by: FAMILY MEDICINE

## 2022-12-01 PROCEDURE — 3008F BODY MASS INDEX DOCD: CPT | Mod: CPTII,S$GLB,, | Performed by: FAMILY MEDICINE

## 2022-12-01 PROCEDURE — 3077F PR MOST RECENT SYSTOLIC BLOOD PRESSURE >= 140 MM HG: ICD-10-PCS | Mod: CPTII,S$GLB,, | Performed by: FAMILY MEDICINE

## 2022-12-01 PROCEDURE — 4010F ACE/ARB THERAPY RXD/TAKEN: CPT | Mod: CPTII,S$GLB,, | Performed by: FAMILY MEDICINE

## 2022-12-01 PROCEDURE — 87651 STREP A DNA AMP PROBE: CPT | Mod: QW,S$GLB,, | Performed by: FAMILY MEDICINE

## 2022-12-01 PROCEDURE — 3080F DIAST BP >= 90 MM HG: CPT | Mod: CPTII,S$GLB,, | Performed by: FAMILY MEDICINE

## 2022-12-01 RX ORDER — PREDNISONE 20 MG/1
20 TABLET ORAL DAILY
Qty: 7 TABLET | Refills: 0 | Status: SHIPPED | OUTPATIENT
Start: 2022-12-01 | End: 2022-12-08

## 2022-12-01 RX ORDER — LORATADINE 10 MG/1
10 TABLET ORAL DAILY
Qty: 30 TABLET | Refills: 2 | Status: SHIPPED | OUTPATIENT
Start: 2022-12-01 | End: 2023-12-01

## 2022-12-01 NOTE — PROGRESS NOTES
Subjective:       Patient ID: Miguel Nieves is a 52 y.o. male.    Vitals:  weight is 102.5 kg (226 lb). His temperature is 98.3 °F (36.8 °C). His blood pressure is 146/100 (abnormal) and his pulse is 80. His respiration is 20 and oxygen saturation is 98%.     Chief Complaint: Sore Throat    Pt is complaining of being sick around black Friday. He said he is having trouble swallowing and severe sore throat    States throat feels swollen, and thick mucous, coughing a lot, denies f/c  Exposed to sick family members a weeka go  Covid Vaccinated       Sore Throat   This is a new problem. The current episode started in the past 7 days. The pain is at a severity of 0/10. The patient is experiencing no pain. Associated symptoms include congestion, coughing, swollen glands and trouble swallowing. Pertinent negatives include no diarrhea, drooling, hoarse voice, plugged ear sensation, neck pain, shortness of breath or stridor. Treatments tried: robtussin.     HENT:  Positive for congestion, sore throat and trouble swallowing. Negative for drooling.    Neck: Negative for neck pain.   Respiratory:  Positive for cough. Negative for shortness of breath and stridor.    Gastrointestinal:  Negative for diarrhea.     Objective:      Physical Exam   Constitutional: He is oriented to person, place, and time. He appears well-developed. He is cooperative.  Non-toxic appearance. He does not appear ill. No distress.   HENT:   Head: Normocephalic and atraumatic.   Ears:   Right Ear: Hearing, tympanic membrane, external ear and ear canal normal.   Left Ear: Hearing, tympanic membrane, external ear and ear canal normal.   Nose: Nose normal. No mucosal edema, rhinorrhea or nasal deformity. No epistaxis. Right sinus exhibits no maxillary sinus tenderness and no frontal sinus tenderness. Left sinus exhibits no maxillary sinus tenderness and no frontal sinus tenderness.   Mouth/Throat: Uvula is midline and mucous membranes are normal. Mucous  membranes are moist. No trismus in the jaw. Normal dentition. No uvula swelling. Posterior oropharyngeal erythema present. No oropharyngeal exudate. Oropharynx is clear.      Comments: Enlarged swollen uvula  No exudates    Eyes: Conjunctivae and lids are normal. Pupils are equal, round, and reactive to light. Right eye exhibits no discharge. Left eye exhibits no discharge. No scleral icterus. Extraocular movement intact   Neck: Trachea normal and phonation normal. Neck supple.   Cardiovascular: Normal rate, regular rhythm, normal heart sounds and normal pulses.   Pulmonary/Chest: Effort normal and breath sounds normal. No respiratory distress.   Abdominal: Normal appearance and bowel sounds are normal. He exhibits no distension and no mass. Soft. There is no abdominal tenderness.   Musculoskeletal: Normal range of motion.         General: No deformity. Normal range of motion.   Neurological: He is alert and oriented to person, place, and time. He exhibits normal muscle tone. Coordination normal.   Skin: Skin is warm, dry, intact, not diaphoretic and not pale.   Psychiatric: His speech is normal and behavior is normal. Judgment and thought content normal.   Nursing note and vitals reviewed.      Assessment:       1. Sore throat    2. Acute pharyngitis, unspecified etiology          Plan:         Sore throat  -     POCT Strep A, Molecular  -     SARS Coronavirus 2 Antigen, POCT Manual Read    Acute pharyngitis, unspecified etiology  -     SARS Coronavirus 2 Antigen, POCT Manual Read    Other orders  -     loratadine (CLARITIN) 10 mg tablet; Take 1 tablet (10 mg total) by mouth once daily.  Dispense: 30 tablet; Refill: 2             Results for orders placed or performed in visit on 12/01/22   POCT Strep A, Molecular   Result Value Ref Range    Molecular Strep A, POC Negative Negative     Acceptable Yes            Results for orders placed or performed in visit on 12/01/22   POCT Strep A, Molecular    Result Value Ref Range    Molecular Strep A, POC Negative Negative     Acceptable Yes    SARS Coronavirus 2 Antigen, POCT Manual Read   Result Value Ref Range    SARS Coronavirus 2 Antigen Negative Negative     Acceptable Yes

## 2023-03-01 ENCOUNTER — HOSPITAL ENCOUNTER (OUTPATIENT)
Dept: RADIOLOGY | Facility: OTHER | Age: 53
Discharge: HOME | End: 2023-03-01
Attending: PHYSICIAN ASSISTANT
Payer: COMMERCIAL

## 2023-03-01 DIAGNOSIS — R04.2 COUGHING UP BLOOD: ICD-10-CM

## 2023-03-01 PROCEDURE — 71046 XR CHEST PA AND LATERAL: ICD-10-PCS | Mod: 26,,, | Performed by: RADIOLOGY

## 2023-03-01 PROCEDURE — 71046 X-RAY EXAM CHEST 2 VIEWS: CPT | Mod: TC,FY

## 2023-03-01 PROCEDURE — 71046 X-RAY EXAM CHEST 2 VIEWS: CPT | Mod: 26,,, | Performed by: RADIOLOGY

## 2023-12-14 ENCOUNTER — OFFICE VISIT (OUTPATIENT)
Dept: URGENT CARE | Facility: CLINIC | Age: 53
End: 2023-12-14
Payer: COMMERCIAL

## 2023-12-14 VITALS
OXYGEN SATURATION: 98 % | RESPIRATION RATE: 19 BRPM | DIASTOLIC BLOOD PRESSURE: 101 MMHG | SYSTOLIC BLOOD PRESSURE: 158 MMHG | BODY MASS INDEX: 33.47 KG/M2 | WEIGHT: 226 LBS | TEMPERATURE: 98 F | HEIGHT: 69 IN | HEART RATE: 69 BPM

## 2023-12-14 DIAGNOSIS — K21.00 GASTROESOPHAGEAL REFLUX DISEASE WITH ESOPHAGITIS WITHOUT HEMORRHAGE: Primary | ICD-10-CM

## 2023-12-14 DIAGNOSIS — I10 HYPERTENSION, UNSPECIFIED TYPE: ICD-10-CM

## 2023-12-14 PROCEDURE — 99214 PR OFFICE/OUTPT VISIT, EST, LEVL IV, 30-39 MIN: ICD-10-PCS | Mod: S$GLB,,, | Performed by: FAMILY MEDICINE

## 2023-12-14 PROCEDURE — 99214 OFFICE O/P EST MOD 30 MIN: CPT | Mod: S$GLB,,, | Performed by: FAMILY MEDICINE

## 2023-12-14 RX ORDER — HYDROCHLOROTHIAZIDE 25 MG/1
TABLET ORAL
Qty: 90 TABLET | Refills: 0 | Status: SHIPPED | OUTPATIENT
Start: 2023-12-14

## 2023-12-14 RX ORDER — PANTOPRAZOLE SODIUM 40 MG/1
40 TABLET, DELAYED RELEASE ORAL DAILY
Qty: 30 TABLET | Refills: 1 | Status: SHIPPED | OUTPATIENT
Start: 2023-12-14 | End: 2023-12-14

## 2023-12-14 RX ORDER — PANTOPRAZOLE SODIUM 40 MG/1
40 TABLET, DELAYED RELEASE ORAL
Qty: 90 TABLET | Refills: 1 | Status: SHIPPED | OUTPATIENT
Start: 2023-12-14

## 2023-12-14 RX ORDER — AMLODIPINE AND BENAZEPRIL HYDROCHLORIDE 10; 20 MG/1; MG/1
CAPSULE ORAL
Qty: 90 CAPSULE | Refills: 2 | Status: SHIPPED | OUTPATIENT
Start: 2023-12-14

## 2023-12-14 NOTE — PROGRESS NOTES
"Subjective:      Patient ID: Miguel Nieves is a 53 y.o. male.    Vitals:  height is 5' 9" (1.753 m) and weight is 102.5 kg (225 lb 15.5 oz). His oral temperature is 98.1 °F (36.7 °C). His blood pressure is 164/104 (abnormal) and his pulse is 69. His respiration is 19 and oxygen saturation is 98%.     Chief Complaint: Heartburn    This is a 53 y.o. male who presents today with a chief complaint of heart burn x 3 days   Pt has hx of HTN and ran ouot of meds, awaiting refill of medication  Now c/o having burping sensation, and lump in the throat, and feels reflux and some heart burn    Denies chest pain or SOB      Heartburn  He complains of heartburn. This is a new problem. The current episode started in the past 7 days. The problem occurs occasionally.       Gastrointestinal:  Positive for heartburn.      Objective:     Physical Exam   Constitutional: He is oriented to person, place, and time. He appears well-developed. He is cooperative.  Non-toxic appearance. He does not appear ill. No distress.   HENT:   Head: Normocephalic and atraumatic.   Ears:   Right Ear: Hearing, tympanic membrane, external ear and ear canal normal.   Left Ear: Hearing, tympanic membrane, external ear and ear canal normal.   Nose: Nose normal. No mucosal edema, rhinorrhea or nasal deformity. No epistaxis. Right sinus exhibits no maxillary sinus tenderness and no frontal sinus tenderness. Left sinus exhibits no maxillary sinus tenderness and no frontal sinus tenderness.   Mouth/Throat: Uvula is midline, oropharynx is clear and moist and mucous membranes are normal. Mucous membranes are moist. No trismus in the jaw. Normal dentition. No uvula swelling. No posterior oropharyngeal erythema. Oropharynx is clear.   Eyes: Conjunctivae and lids are normal. Pupils are equal, round, and reactive to light. Right eye exhibits no discharge. Left eye exhibits no discharge. No scleral icterus. Extraocular movement intact   Neck: Trachea normal and " phonation normal. Neck supple.   Cardiovascular: Normal rate, regular rhythm, normal heart sounds and normal pulses.   Pulmonary/Chest: Effort normal and breath sounds normal. No respiratory distress.   Abdominal: Normal appearance and bowel sounds are normal. He exhibits no distension and no mass. Soft. There is no abdominal tenderness.   Musculoskeletal: Normal range of motion.         General: No deformity. Normal range of motion.   Neurological: He is alert and oriented to person, place, and time. He exhibits normal muscle tone. Coordination normal.   Skin: Skin is warm, dry, intact, not diaphoretic and not pale.   Psychiatric: His speech is normal and behavior is normal. Judgment and thought content normal.   Nursing note and vitals reviewed.      Assessment:     1. Gastroesophageal reflux disease with esophagitis without hemorrhage    2. Hypertension, unspecified type        Plan:       Gastroesophageal reflux disease with esophagitis without hemorrhage  -     EKG 12-lead    Hypertension, unspecified type  -     EKG 12-lead    Other orders  -     pantoprazole (PROTONIX) 40 MG tablet; Take 1 tablet (40 mg total) by mouth once daily.  Dispense: 30 tablet; Refill: 1         Advised life style modification    Start BP medication    Follow up with PCP/ Cardiologist           Since EKG with no acute changes  Will Treat him with PPI    FU with his PCP

## 2024-08-30 ENCOUNTER — TELEPHONE (OUTPATIENT)
Dept: UROLOGY | Facility: CLINIC | Age: 54
End: 2024-08-30
Payer: COMMERCIAL

## 2024-10-08 ENCOUNTER — TELEPHONE (OUTPATIENT)
Dept: PRIMARY CARE CLINIC | Facility: CLINIC | Age: 54
End: 2024-10-08
Payer: COMMERCIAL

## 2024-11-27 ENCOUNTER — OFFICE VISIT (OUTPATIENT)
Dept: URGENT CARE | Facility: CLINIC | Age: 54
End: 2024-11-27
Payer: COMMERCIAL

## 2024-11-27 VITALS
RESPIRATION RATE: 20 BRPM | WEIGHT: 226 LBS | HEART RATE: 85 BPM | SYSTOLIC BLOOD PRESSURE: 176 MMHG | BODY MASS INDEX: 33.47 KG/M2 | DIASTOLIC BLOOD PRESSURE: 118 MMHG | OXYGEN SATURATION: 96 % | HEIGHT: 69 IN | TEMPERATURE: 99 F

## 2024-11-27 DIAGNOSIS — H65.193 ACUTE EFFUSION OF BOTH MIDDLE EARS: ICD-10-CM

## 2024-11-27 DIAGNOSIS — R05.9 COUGH, UNSPECIFIED TYPE: ICD-10-CM

## 2024-11-27 DIAGNOSIS — R09.81 NASAL CONGESTION: ICD-10-CM

## 2024-11-27 DIAGNOSIS — R06.2 WHEEZING: ICD-10-CM

## 2024-11-27 DIAGNOSIS — J20.9 ACUTE BRONCHITIS, UNSPECIFIED ORGANISM: Primary | ICD-10-CM

## 2024-11-27 LAB
CTP QC/QA: YES
SARS-COV-2 AG RESP QL IA.RAPID: NEGATIVE

## 2024-11-27 RX ORDER — LORATADINE 10 MG/1
10 TABLET ORAL DAILY
Qty: 30 TABLET | Refills: 0 | Status: SHIPPED | OUTPATIENT
Start: 2024-11-27 | End: 2024-12-27

## 2024-11-27 RX ORDER — FLUTICASONE PROPIONATE 50 MCG
1 SPRAY, SUSPENSION (ML) NASAL 2 TIMES DAILY
Qty: 16 G | Refills: 0 | Status: SHIPPED | OUTPATIENT
Start: 2024-11-27

## 2024-11-27 RX ORDER — ALBUTEROL SULFATE 90 UG/1
1 INHALANT RESPIRATORY (INHALATION) EVERY 4 HOURS PRN
COMMUNITY
Start: 2024-11-18

## 2024-11-27 RX ORDER — PREDNISONE 20 MG/1
20 TABLET ORAL 2 TIMES DAILY
Qty: 10 TABLET | Refills: 0 | Status: SHIPPED | OUTPATIENT
Start: 2024-11-27 | End: 2024-12-02

## 2024-11-27 RX ORDER — BENZONATATE 200 MG/1
200 CAPSULE ORAL 3 TIMES DAILY PRN
Qty: 30 CAPSULE | Refills: 0 | Status: SHIPPED | OUTPATIENT
Start: 2024-11-27 | End: 2024-12-07

## 2024-11-27 NOTE — PROGRESS NOTES
"  Subjective:      Patient ID: Miguel Nieves is a 54 y.o. male.    Vitals:  height is 5' 9" (1.753 m) and weight is 102.5 kg (225 lb 15.5 oz). His oral temperature is 98.6 °F (37 °C). His blood pressure is 176/118 (abnormal) and his pulse is 85. His respiration is 20 and oxygen saturation is 96%.     Chief Complaint: Nasal Congestion (More in the chest, chest congestion. Bronchial   Coughing.  Nasty phlegm    Maybe pneumonia     No fever - Entered by patient) and Cough    54-year-old male presents to the clinic today with chief complaint of productive cough with yellow sputum, nasal congestion, wheezing, chest congestion. Symptoms started 2 weeks ago and have worsened the last 3 days.  Patient has used his albuterol inhaler as needed, otc cough syrup and Mucinex as needed.  He states he was around his sick coworkers.  Denies any recent travel.  Denies history of seasonal allergies. He did have a  hx of asthma.  Denies any pain or radiation of pain. Denies fever, chills, body aches, chest pain, shortness of breath, abdominal pain, nausea, vomiting, diarrhea, or rashes.      Cough  This is a new problem. The current episode started 1 to 4 weeks ago. The problem has been gradually worsening. The problem occurs constantly. The cough is Productive of sputum. Associated symptoms include nasal congestion, postnasal drip and wheezing. Pertinent negatives include no chest pain, chills, ear congestion, ear pain, fever, headaches, myalgias, rash, sore throat or shortness of breath. Nothing aggravates the symptoms. He has tried OTC cough suppressant and OTC inhaler for the symptoms. The treatment provided no relief. His past medical history is significant for environmental allergies. There is no history of asthma, bronchitis or pneumonia.     Constitution: Negative for activity change, chills, sweating, fatigue, fever, generalized weakness and international travel in last 60 days.   HENT:  Positive for congestion and " postnasal drip. Negative for ear pain, sore throat and trouble swallowing.    Neck: Negative for neck pain.   Cardiovascular:  Negative for chest pain.   Eyes:  Negative for eye pain.   Respiratory:  Positive for chest tightness, cough, sputum production, wheezing and asthma. Negative for shortness of breath.    Gastrointestinal:  Negative for abdominal pain, nausea, vomiting and diarrhea.   Genitourinary:  Negative for dysuria.   Musculoskeletal:  Negative for pain and muscle ache.   Skin:  Negative for rash.   Allergic/Immunologic: Positive for environmental allergies and asthma. Negative for seasonal allergies and food allergies.   Neurological:  Negative for dizziness and headaches.   Psychiatric/Behavioral:  Negative for nervous/anxious. The patient is not nervous/anxious.       Objective:     Physical Exam   Constitutional: He is oriented to person, place, and time. He appears well-developed. He is cooperative.  Non-toxic appearance. He does not appear ill. No distress.   HENT:   Head: Normocephalic and atraumatic.   Ears:   Right Ear: Hearing, external ear and ear canal normal. A middle ear effusion is present.   Left Ear: Hearing, external ear and ear canal normal. A middle ear effusion is present.   Nose: Rhinorrhea (turbinates erytheatmous and enlarged) present. No mucosal edema, purulent discharge or nasal deformity. No epistaxis. Right sinus exhibits no maxillary sinus tenderness and no frontal sinus tenderness. Left sinus exhibits no maxillary sinus tenderness and no frontal sinus tenderness.   Mouth/Throat: Uvula is midline, oropharynx is clear and moist and mucous membranes are normal. No trismus in the jaw. Normal dentition. No uvula swelling. Cobblestoning present. No oropharyngeal exudate, posterior oropharyngeal edema, posterior oropharyngeal erythema or tonsillar abscesses. Tonsils are 1+ on the right. Tonsils are 1+ on the left. No tonsillar exudate.   Eyes: Conjunctivae and lids are normal. No  scleral icterus. Extraocular movement intact   Neck: Trachea normal and phonation normal. Neck supple. No edema present. No erythema present. No neck rigidity present.   Cardiovascular: Normal rate, regular rhythm, normal heart sounds and normal pulses.   Pulmonary/Chest: Effort normal. No stridor. No respiratory distress. He has no decreased breath sounds. He has wheezes. He has no rhonchi. He has no rales.   Abdominal: Normal appearance.   Musculoskeletal: Normal range of motion.         General: No deformity. Normal range of motion.   Lymphadenopathy:     He has no cervical adenopathy.   Neurological: He is alert and oriented to person, place, and time. He exhibits normal muscle tone. Coordination normal.   Skin: Skin is warm, dry, intact, not diaphoretic and not pale.   Psychiatric: His speech is normal and behavior is normal. Judgment and thought content normal.   Nursing note and vitals reviewed.      Assessment:     1. Acute bronchitis, unspecified organism    2. Cough, unspecified type    3. Wheezing    4. Nasal congestion    5. Acute effusion of both middle ears      Results for orders placed or performed in visit on 11/27/24   SARS Coronavirus 2 Antigen, POCT Manual Read    Collection Time: 11/27/24  8:36 AM   Result Value Ref Range    SARS Coronavirus 2 Antigen Negative Negative     Acceptable Yes          Plan:       Acute bronchitis, unspecified organism  -     benzonatate (TESSALON) 200 MG capsule; Take 1 capsule (200 mg total) by mouth 3 (three) times daily as needed for Cough.  Dispense: 30 capsule; Refill: 0    Cough, unspecified type  -     SARS Coronavirus 2 Antigen, POCT Manual Read    Wheezing  -     predniSONE (DELTASONE) 20 MG tablet; Take 1 tablet (20 mg total) by mouth 2 (two) times daily. for 5 days  Dispense: 10 tablet; Refill: 0    Nasal congestion  -     fluticasone propionate (FLONASE) 50 mcg/actuation nasal spray; 1 spray (50 mcg total) by Each Nostril route 2 (two)  times daily.  Dispense: 16 g; Refill: 0  -     loratadine (CLARITIN) 10 mg tablet; Take 1 tablet (10 mg total) by mouth once daily.  Dispense: 30 tablet; Refill: 0    Acute effusion of both middle ears  -     predniSONE (DELTASONE) 20 MG tablet; Take 1 tablet (20 mg total) by mouth 2 (two) times daily. for 5 days  Dispense: 10 tablet; Refill: 0  -     fluticasone propionate (FLONASE) 50 mcg/actuation nasal spray; 1 spray (50 mcg total) by Each Nostril route 2 (two) times daily.  Dispense: 16 g; Refill: 0  -     loratadine (CLARITIN) 10 mg tablet; Take 1 tablet (10 mg total) by mouth once daily.  Dispense: 30 tablet; Refill: 0

## 2024-11-27 NOTE — PATIENT INSTRUCTIONS
The CDC recommends should the patient develop a fever or starts to feel worse after they have returned to normal activities, they should return home and away from others for at least another 24 hours.     Please drink plenty of fluids.  Please get plenty of rest.  Please return here or go to the Emergency Department for any concerns or worsening of condition.  If you were prescribed prednisone, please take them to completion.  Recommended using albuterol inhaler at least twice today and then starting tomorrow use as needed every 4-6 hours. Use flonase nasal spray twice daily (use 30 minutes before bedtime at night) and take daily calritin as directed for five-ten days. Use tessalon perles for cough as needed. Recommended taking vitamin D and zinc supplements for immune support.   Recommended for patient to drink hot tea with honey. Consider eating softer foods such as soup and broth for the next couple of days to prevent further throat irritation. Recommended for patient to refrain from acidic foods (such as tomatoes or caffeine) to prevent throat irritation for the next couple of days.   Recommend humidifier, hot showers for sinus drainage. Recommend elevating your head at night with two pillows to prevent post nasal drip and cough at night. Recommend over the counter benadryl allergy plus congestion that includes a nasal decongestant in it if you do not have good results with nasal spray at bed time.   If you do not have Hypertension or any history of palpitations, it is ok to take over the counter Sudafed or Mucinex D or Allegra-D or Claritin-D or Zyrtec-D.  If you do take one of the above, it is ok to combine that with plain over the counter Mucinex or Allegra or Claritin or Zyrtec.  If for example you are taking Zyrtec -D, you can combine that with Mucinex, but not Mucinex-D.  If you are taking Mucinex-D, you can combine that with plain Allegra or Claritin or Zyrtec.   If you do have Hypertension or  palpitations, it is safe to take Coricidin HBP for relief of sinus symptoms.  If not allergic, please take over the counter Tylenol (Acetaminophen) and/or Motrin (Ibuprofen) as directed for control of pain and/or fever.  Please follow up with your primary care doctor or specialist as needed.    If you  smoke, please stop smoking.

## 2025-02-26 ENCOUNTER — TELEPHONE (OUTPATIENT)
Dept: PRIMARY CARE CLINIC | Facility: CLINIC | Age: 55
End: 2025-02-26
Payer: COMMERCIAL

## 2025-02-26 NOTE — TELEPHONE ENCOUNTER
LVM to get pt scheduled to see Dr. Barrios tomorrow at 12 pm per Dr. Barrios. Pt wife stated that the pt informed her it was okay to schedule him at that time. Pt scheduled on 02/27/25 at 12 pm.

## 2025-02-27 ENCOUNTER — OFFICE VISIT (OUTPATIENT)
Dept: PRIMARY CARE CLINIC | Facility: CLINIC | Age: 55
End: 2025-02-27
Payer: COMMERCIAL

## 2025-02-27 ENCOUNTER — LAB VISIT (OUTPATIENT)
Dept: LAB | Facility: HOSPITAL | Age: 55
End: 2025-02-27
Attending: FAMILY MEDICINE
Payer: COMMERCIAL

## 2025-02-27 ENCOUNTER — TELEPHONE (OUTPATIENT)
Dept: PRIMARY CARE CLINIC | Facility: CLINIC | Age: 55
End: 2025-02-27

## 2025-02-27 VITALS
SYSTOLIC BLOOD PRESSURE: 142 MMHG | DIASTOLIC BLOOD PRESSURE: 100 MMHG | WEIGHT: 226.44 LBS | BODY MASS INDEX: 33.54 KG/M2 | HEART RATE: 86 BPM | HEIGHT: 69 IN | OXYGEN SATURATION: 98 %

## 2025-02-27 DIAGNOSIS — Z00.00 ANNUAL PHYSICAL EXAM: ICD-10-CM

## 2025-02-27 DIAGNOSIS — R06.83 SNORING: ICD-10-CM

## 2025-02-27 DIAGNOSIS — E78.49 OTHER HYPERLIPIDEMIA: ICD-10-CM

## 2025-02-27 DIAGNOSIS — Z79.899 ENCOUNTER FOR MONITORING LONG-TERM PROTON PUMP INHIBITOR THERAPY: ICD-10-CM

## 2025-02-27 DIAGNOSIS — E66.09 CLASS 1 OBESITY DUE TO EXCESS CALORIES WITH SERIOUS COMORBIDITY AND BODY MASS INDEX (BMI) OF 33.0 TO 33.9 IN ADULT: ICD-10-CM

## 2025-02-27 DIAGNOSIS — Z86.0100 HISTORY OF COLON POLYPS: ICD-10-CM

## 2025-02-27 DIAGNOSIS — Z86.03 HISTORY OF TRANSURETHRAL RESECTION OF BLADDER TUMOR (TURBT): ICD-10-CM

## 2025-02-27 DIAGNOSIS — Z12.5 ENCOUNTER FOR SCREENING PROSTATE SPECIFIC ANTIGEN (PSA) MEASUREMENT: ICD-10-CM

## 2025-02-27 DIAGNOSIS — I10 ESSENTIAL HYPERTENSION: ICD-10-CM

## 2025-02-27 DIAGNOSIS — J45.909 ASTHMA, UNSPECIFIED ASTHMA SEVERITY, UNSPECIFIED WHETHER COMPLICATED, UNSPECIFIED WHETHER PERSISTENT: ICD-10-CM

## 2025-02-27 DIAGNOSIS — R06.81 WITNESSED EPISODE OF APNEA: ICD-10-CM

## 2025-02-27 DIAGNOSIS — I10 WHITE COAT SYNDROME WITH DIAGNOSIS OF HYPERTENSION: ICD-10-CM

## 2025-02-27 DIAGNOSIS — R80.9 PROTEINURIA, UNSPECIFIED TYPE: ICD-10-CM

## 2025-02-27 DIAGNOSIS — K76.0 FATTY LIVER: ICD-10-CM

## 2025-02-27 DIAGNOSIS — E66.811 CLASS 1 OBESITY DUE TO EXCESS CALORIES WITH SERIOUS COMORBIDITY AND BODY MASS INDEX (BMI) OF 33.0 TO 33.9 IN ADULT: ICD-10-CM

## 2025-02-27 DIAGNOSIS — Z13.6 ENCOUNTER FOR SCREENING FOR CARDIOVASCULAR DISORDERS: ICD-10-CM

## 2025-02-27 DIAGNOSIS — Z98.890 HISTORY OF TRANSURETHRAL RESECTION OF BLADDER TUMOR (TURBT): ICD-10-CM

## 2025-02-27 DIAGNOSIS — Z51.81 ENCOUNTER FOR MONITORING LONG-TERM PROTON PUMP INHIBITOR THERAPY: ICD-10-CM

## 2025-02-27 DIAGNOSIS — Z82.49 FAMILY HISTORY OF HEART DISEASE IN MALE FAMILY MEMBER BEFORE AGE 55: ICD-10-CM

## 2025-02-27 DIAGNOSIS — K21.9 GASTROESOPHAGEAL REFLUX DISEASE, UNSPECIFIED WHETHER ESOPHAGITIS PRESENT: ICD-10-CM

## 2025-02-27 DIAGNOSIS — M54.50 ACUTE BILATERAL LOW BACK PAIN WITHOUT SCIATICA: ICD-10-CM

## 2025-02-27 DIAGNOSIS — N20.0 KIDNEY STONE ON RIGHT SIDE: ICD-10-CM

## 2025-02-27 DIAGNOSIS — T75.3XXA MOTION SICKNESS, INITIAL ENCOUNTER: ICD-10-CM

## 2025-02-27 DIAGNOSIS — Z00.00 ANNUAL PHYSICAL EXAM: Primary | ICD-10-CM

## 2025-02-27 LAB
25(OH)D3+25(OH)D2 SERPL-MCNC: 11 NG/ML (ref 30–96)
ALBUMIN SERPL BCP-MCNC: 4.4 G/DL (ref 3.5–5.2)
ALP SERPL-CCNC: 62 U/L (ref 40–150)
ALT SERPL W/O P-5'-P-CCNC: 38 U/L (ref 10–44)
ANION GAP SERPL CALC-SCNC: 13 MMOL/L (ref 8–16)
AST SERPL-CCNC: 58 U/L (ref 10–40)
BASOPHILS # BLD AUTO: 0.08 K/UL (ref 0–0.2)
BASOPHILS NFR BLD: 0.9 % (ref 0–1.9)
BILIRUB SERPL-MCNC: 1 MG/DL (ref 0.1–1)
BUN SERPL-MCNC: 15 MG/DL (ref 6–20)
CALCIUM SERPL-MCNC: 10.4 MG/DL (ref 8.7–10.5)
CHLORIDE SERPL-SCNC: 102 MMOL/L (ref 95–110)
CHOLEST SERPL-MCNC: 163 MG/DL (ref 120–199)
CHOLEST/HDLC SERPL: 4.1 {RATIO} (ref 2–5)
CO2 SERPL-SCNC: 25 MMOL/L (ref 23–29)
COMPLEXED PSA SERPL-MCNC: 0.93 NG/ML (ref 0–4)
CREAT SERPL-MCNC: 1.2 MG/DL (ref 0.5–1.4)
CREAT UR-MCNC: 51 MG/DL (ref 23–375)
DIFFERENTIAL METHOD BLD: ABNORMAL
EOSINOPHIL # BLD AUTO: 0.3 K/UL (ref 0–0.5)
EOSINOPHIL NFR BLD: 3 % (ref 0–8)
ERYTHROCYTE [DISTWIDTH] IN BLOOD BY AUTOMATED COUNT: 13.4 % (ref 11.5–14.5)
EST. GFR  (NO RACE VARIABLE): >60 ML/MIN/1.73 M^2
ESTIMATED AVG GLUCOSE: 134 MG/DL (ref 68–131)
GLUCOSE SERPL-MCNC: 115 MG/DL (ref 70–110)
HBA1C MFR BLD: 6.3 % (ref 4–5.6)
HCT VFR BLD AUTO: 50.1 % (ref 40–54)
HDLC SERPL-MCNC: 40 MG/DL (ref 40–75)
HDLC SERPL: 24.5 % (ref 20–50)
HGB BLD-MCNC: 16.6 G/DL (ref 14–18)
IMM GRANULOCYTES # BLD AUTO: 0.02 K/UL (ref 0–0.04)
IMM GRANULOCYTES NFR BLD AUTO: 0.2 % (ref 0–0.5)
LDLC SERPL CALC-MCNC: 93.6 MG/DL (ref 63–159)
LYMPHOCYTES # BLD AUTO: 1.3 K/UL (ref 1–4.8)
LYMPHOCYTES NFR BLD: 14.2 % (ref 18–48)
MCH RBC QN AUTO: 27.9 PG (ref 27–31)
MCHC RBC AUTO-ENTMCNC: 33.1 G/DL (ref 32–36)
MCV RBC AUTO: 84 FL (ref 82–98)
MONOCYTES # BLD AUTO: 0.6 K/UL (ref 0.3–1)
MONOCYTES NFR BLD: 6.9 % (ref 4–15)
NEUTROPHILS # BLD AUTO: 6.7 K/UL (ref 1.8–7.7)
NEUTROPHILS NFR BLD: 74.8 % (ref 38–73)
NONHDLC SERPL-MCNC: 123 MG/DL
NRBC BLD-RTO: 0 /100 WBC
OHS QRS DURATION: 104 MS
OHS QTC CALCULATION: 438 MS
PLATELET # BLD AUTO: 319 K/UL (ref 150–450)
PMV BLD AUTO: 9.7 FL (ref 9.2–12.9)
POTASSIUM SERPL-SCNC: 3.7 MMOL/L (ref 3.5–5.1)
PROT SERPL-MCNC: 8.5 G/DL (ref 6–8.4)
PROT UR-MCNC: 51 MG/DL (ref 0–15)
PROT/CREAT UR: 1 MG/G{CREAT} (ref 0–0.2)
RBC # BLD AUTO: 5.94 M/UL (ref 4.6–6.2)
SODIUM SERPL-SCNC: 140 MMOL/L (ref 136–145)
TRIGL SERPL-MCNC: 147 MG/DL (ref 30–150)
TSH SERPL DL<=0.005 MIU/L-ACNC: 0.83 UIU/ML (ref 0.4–4)
WBC # BLD AUTO: 8.9 K/UL (ref 3.9–12.7)

## 2025-02-27 PROCEDURE — 84156 ASSAY OF PROTEIN URINE: CPT | Performed by: FAMILY MEDICINE

## 2025-02-27 PROCEDURE — 84153 ASSAY OF PSA TOTAL: CPT | Performed by: FAMILY MEDICINE

## 2025-02-27 PROCEDURE — 84443 ASSAY THYROID STIM HORMONE: CPT | Performed by: FAMILY MEDICINE

## 2025-02-27 PROCEDURE — 83036 HEMOGLOBIN GLYCOSYLATED A1C: CPT | Performed by: FAMILY MEDICINE

## 2025-02-27 PROCEDURE — 93010 ELECTROCARDIOGRAM REPORT: CPT | Mod: S$GLB,,, | Performed by: INTERNAL MEDICINE

## 2025-02-27 PROCEDURE — 99999 PR PBB SHADOW E&M-EST. PATIENT-LVL V: CPT | Mod: PBBFAC,,, | Performed by: FAMILY MEDICINE

## 2025-02-27 PROCEDURE — 36415 COLL VENOUS BLD VENIPUNCTURE: CPT | Mod: PN | Performed by: FAMILY MEDICINE

## 2025-02-27 PROCEDURE — 80061 LIPID PANEL: CPT | Performed by: FAMILY MEDICINE

## 2025-02-27 PROCEDURE — 93005 ELECTROCARDIOGRAM TRACING: CPT | Mod: S$GLB,,, | Performed by: FAMILY MEDICINE

## 2025-02-27 PROCEDURE — 85025 COMPLETE CBC W/AUTO DIFF WBC: CPT | Performed by: FAMILY MEDICINE

## 2025-02-27 PROCEDURE — 80053 COMPREHEN METABOLIC PANEL: CPT | Performed by: FAMILY MEDICINE

## 2025-02-27 PROCEDURE — 82306 VITAMIN D 25 HYDROXY: CPT | Performed by: FAMILY MEDICINE

## 2025-02-27 RX ORDER — HYDROCHLOROTHIAZIDE 25 MG/1
TABLET ORAL
Qty: 90 TABLET | Refills: 0 | OUTPATIENT
Start: 2025-02-27

## 2025-02-27 RX ORDER — HYDROCHLOROTHIAZIDE 25 MG/1
25 TABLET ORAL DAILY
Qty: 90 TABLET | Refills: 1 | Status: SHIPPED | OUTPATIENT
Start: 2025-02-27

## 2025-02-27 RX ORDER — AMLODIPINE AND BENAZEPRIL HYDROCHLORIDE 10; 20 MG/1; MG/1
1 CAPSULE ORAL
Qty: 90 CAPSULE | Refills: 2 | OUTPATIENT
Start: 2025-02-27

## 2025-02-27 RX ORDER — OMEPRAZOLE 20 MG/1
20 TABLET, DELAYED RELEASE ORAL DAILY
COMMUNITY

## 2025-02-27 RX ORDER — ROSUVASTATIN CALCIUM 10 MG/1
10 TABLET, COATED ORAL DAILY
Qty: 90 TABLET | Refills: 3 | Status: SHIPPED | OUTPATIENT
Start: 2025-02-27

## 2025-02-27 RX ORDER — SCOPOLAMINE 1 MG/3D
1 PATCH, EXTENDED RELEASE TRANSDERMAL
Qty: 4 PATCH | Refills: 0 | Status: SHIPPED | OUTPATIENT
Start: 2025-02-27

## 2025-02-27 RX ORDER — AMLODIPINE AND BENAZEPRIL HYDROCHLORIDE 10; 20 MG/1; MG/1
1 CAPSULE ORAL DAILY
Qty: 90 CAPSULE | Refills: 2 | Status: SHIPPED | OUTPATIENT
Start: 2025-02-27

## 2025-02-27 NOTE — PROGRESS NOTES
"Subjective:       Patient ID: Miguel Nieves is a 55 y.o. male.    Chief Complaint: Establish Care (Pt here to est care/ would like to discuss higher blood pressure/ )    HPI 54 y/o male with HTN, Asthma, HLPD, is here for annual exam and to establish care.     He is feeling good in general, his blood pressure has been running high at home, 130-160's/'s, he notes he has white coat syndrome in addition to the hypertension. He recently started working out 5 days a week with the new year, he is doing cardio and weights.He is trying to eat healthy.     He has been having bilateral lower back pain that radiates to his groin off and on for the past 2 weeks, he thinks it started when he lifted a heavy microwave at work, he has had 2 episodes that last several days, pain is achy and does not radiate, sometimes its just a stiff tight feeling, 2-7/10, worse when bending forward, worse with prolonged standing and better at rest, he denies saddle anesthesia/leg weakness/bowel or bladder incontinence, he took Aleve daily for a few days that helped. No back imaging done. No PT.    He associates gets groin pain with back pain with prostatitis and 2 weeks ago he took 3 days of Cipro just in case    He denies f/n/v, increased belching x 2 weeks, he took a 5 day course of Prilosec OTC which helped, he does get occasional heartburn and takes omeprazole which helps, he is triggered with spicy food and red sauce, he denies dysphagia, but notes he avoids soda as it seem like its hard to go down, he denies d, rare constipation, he denies cp/sob//urinary sx. He snores, he has witnessed apnea per his wife and has been told in the past he had sleep apnea, he is tired often in the morning. Mood is good, he is stressed out when he comes to the doctor.    HTN: amlodipine-benazepril 10-20 mg daily, HCTZ 25 mg daily  Family hx of early CAD: has appt with Cardiology  HLPD: Crestor 10 mg daily  Hematuria s/p hx TURBT, path "Cystitis " "Cystica"/BPH/Prostatitis: following with SHIRLEY Bell, he follows yearly  Proteinuria  Kidney stone: US 2019 R nonobstructine renal stone  Asthma: albuterol prn when he has a cold or in cold weather  Derm following with Dr. Ruiz  GERD: never had EGD, omeprazole prn  [place] [address] care everywhere  Eye exam utd [facility]  Dental utd  Colonoscopy done at [facility]  Dr. Pat, had polyps and was told he was due in 3 years, he is due this summer will request report      Review of Systems    Objective:      BP (!) 142/100 (BP Location: Right arm, Patient Position: Sitting)   Pulse 86   Ht 5' 9" (1.753 m)   Wt 102.7 kg (226 lb 6.6 oz)   SpO2 98%   BMI 33.44 kg/m²   Physical Exam  Vitals and nursing note reviewed.   Constitutional:       Appearance: He is well-developed.   HENT:      Head: Normocephalic and atraumatic.      Mouth/Throat:      Pharynx: No oropharyngeal exudate or posterior oropharyngeal erythema.   Neck:      Thyroid: No thyromegaly.   Cardiovascular:      Rate and Rhythm: Normal rate and regular rhythm.      Heart sounds: Normal heart sounds.   Pulmonary:      Effort: Pulmonary effort is normal. No respiratory distress.      Breath sounds: Normal breath sounds.   Abdominal:      General: Bowel sounds are normal. There is no distension.      Palpations: Abdomen is soft. There is no mass.      Tenderness: There is no abdominal tenderness.   Musculoskeletal:      Cervical back: Normal range of motion and neck supple.      Right lower leg: No edema.      Left lower leg: No edema.   Lymphadenopathy:      Cervical: No cervical adenopathy.   Skin:     General: Skin is warm and dry.   Neurological:      Mental Status: He is alert.         Assessment:       1. Annual physical exam    2. Other hyperlipidemia    3. Essential hypertension    4. Gastroesophageal reflux disease, unspecified whether esophagitis present    5. Asthma, unspecified asthma severity, unspecified whether complicated, " unspecified whether persistent    6. History of transurethral resection of bladder tumor (TURBT)    7. Kidney stone on right side    8. Encounter for monitoring long-term proton pump inhibitor therapy    9. Family history of heart disease in male family member before age 55    10. Snoring    11. Witnessed episode of apnea    12. Class 1 obesity due to excess calories with serious comorbidity and body mass index (BMI) of 33.0 to 33.9 in adult    13. Proteinuria, unspecified type    14. Fatty liver    15. White coat syndrome with diagnosis of hypertension    16. History of colon polyps    17. Encounter for screening for cardiovascular disorders    18. Acute bilateral low back pain without sciatica    19. Encounter for screening prostate specific antigen (PSA) measurement    20. Motion sickness, initial encounter        Plan:   Miguel was seen today for establish care.    Diagnoses and all orders for this visit:    Annual physical exam  -     Protein/Creatinine Ratio, Urine; Future  -     CBC Auto Differential; Future  -     Comprehensive Metabolic Panel; Future  -     Hemoglobin A1C; Future  -     Lipid Panel; Future  -     TSH; Future  -     Vitamin D; Future  -     PSA, Screening; Future    Other hyperlipidemia  -     Lipid Panel; Future  -     rosuvastatin (CRESTOR) 10 MG tablet; Take 1 tablet (10 mg total) by mouth once daily.  -     CT Cardiac Scoring; Future  -     IN OFFICE EKG 12-LEAD (to Muse)    Essential hypertension  -     CBC Auto Differential; Future  -     Comprehensive Metabolic Panel; Future  -     Hemoglobin A1C; Future  -     TSH; Future  -     rosuvastatin (CRESTOR) 10 MG tablet; Take 1 tablet (10 mg total) by mouth once daily.  -     hydroCHLOROthiazide (HYDRODIURIL) 25 MG tablet; Take 1 tablet (25 mg total) by mouth once daily. TAKE 1 TABLET(25 MG) BY MOUTH DAILY  -     amlodipine-benazepril 10-20mg (LOTREL) 10-20 mg per capsule; Take 1 capsule by mouth once daily.  -     CT Cardiac Scoring;  Future  -     IN OFFICE EKG 12-LEAD (to Muse)    Gastroesophageal reflux disease, unspecified whether esophagitis present    Asthma, unspecified asthma severity, unspecified whether complicated, unspecified whether persistent    History of transurethral resection of bladder tumor (TURBT)    Kidney stone on right side    Encounter for monitoring long-term proton pump inhibitor therapy    Family history of heart disease in male family member before age 55  -     CT Cardiac Scoring; Future    Snoring  -     Ambulatory referral/consult to Sleep Disorders; Future    Witnessed episode of apnea  -     Ambulatory referral/consult to Sleep Disorders; Future    Class 1 obesity due to excess calories with serious comorbidity and body mass index (BMI) of 33.0 to 33.9 in adult  -     Vitamin D; Future  -     Ambulatory referral/consult to Hepatology; Future  -     CT Cardiac Scoring; Future  -     IN OFFICE EKG 12-LEAD (to Muse)    Proteinuria, unspecified type  -     Protein/Creatinine Ratio, Urine; Future    Fatty liver  -     Comprehensive Metabolic Panel; Future  -     Hemoglobin A1C; Future  -     Lipid Panel; Future  -     rosuvastatin (CRESTOR) 10 MG tablet; Take 1 tablet (10 mg total) by mouth once daily.  -     Ambulatory referral/consult to Hepatology; Future    White coat syndrome with diagnosis of hypertension    History of colon polyps    Encounter for screening for cardiovascular disorders  -     CT Cardiac Scoring; Future    Acute bilateral low back pain without sciatica  -     X-Ray Lumbar Spine 5 View; Future  -     Ambulatory referral/consult to [facility]; Future    Encounter for screening prostate specific antigen (PSA) measurement  -     PSA, Screening; Future    Motion sickness, initial encounter  -     scopolamine (TRANSDERM-SCOP) 1.3-1.5 mg (1 mg over 3 days); Place 1 patch onto the skin every 72 hours.

## 2025-02-27 NOTE — LETTER
AUTHORIZATION FOR RELEASE OF   CONFIDENTIAL INFORMATION    Dear Dr. Alves,    We are seeing Miguel Nieves, date of birth 1970, in the clinic at [facility]. Mony Barrios MD is the patient's PCP. Miguel Nieves has an outstanding lab/procedure at the time we reviewed his chart. In order to help keep his health information updated, he has authorized us to request the following medical record(s):        (  )  MAMMOGRAM                                      ( X )  COLONOSCOPY      (  )  PAP SMEAR                                          (  )  OUTSIDE LAB RESULTS     (  )  DEXA SCAN                                          (  )  EYE EXAM            (  )  FOOT EXAM                                          ( X )  ENTIRE RECORD     (  )  OUTSIDE IMMUNIZATIONS                 (  )  _______________         Please fax records to [facility], Jones, Rebecca, MD, [phone]     If you have any questions, please contact Rossy at [phone].           Patient Name: Miguel Nieves  : 1970  Patient Phone #: [phone]

## 2025-02-27 NOTE — TELEPHONE ENCOUNTER
----- Message from Suhail sent at 2/27/2025 11:25 AM CST -----  Regarding: Results  Contact: Pt [phone]  2TESTRESULTSType: Test ResultsWhat test was performed? BloodworkWho ordered the test? Dr. Armstrong and where were the test performed? 2/27 [facility] back and or thru [facility]: CallComments:

## 2025-03-10 ENCOUNTER — RESULTS FOLLOW-UP (OUTPATIENT)
Dept: PRIMARY CARE CLINIC | Facility: CLINIC | Age: 55
End: 2025-03-10

## 2025-03-10 ENCOUNTER — OFFICE VISIT (OUTPATIENT)
Dept: HEPATOLOGY | Facility: CLINIC | Age: 55
End: 2025-03-10
Payer: COMMERCIAL

## 2025-03-10 VITALS — HEIGHT: 69 IN | WEIGHT: 231.94 LBS | BODY MASS INDEX: 34.35 KG/M2

## 2025-03-10 DIAGNOSIS — E78.49 OTHER HYPERLIPIDEMIA: ICD-10-CM

## 2025-03-10 DIAGNOSIS — R73.03 PREDIABETES: ICD-10-CM

## 2025-03-10 DIAGNOSIS — I10 ESSENTIAL HYPERTENSION: ICD-10-CM

## 2025-03-10 DIAGNOSIS — R73.03 PREDIABETES: Primary | ICD-10-CM

## 2025-03-10 DIAGNOSIS — R80.9 PROTEINURIA, UNSPECIFIED TYPE: ICD-10-CM

## 2025-03-10 DIAGNOSIS — R74.8 ELEVATED LIVER ENZYMES: ICD-10-CM

## 2025-03-10 DIAGNOSIS — E66.09 CLASS 1 OBESITY DUE TO EXCESS CALORIES WITH SERIOUS COMORBIDITY AND BODY MASS INDEX (BMI) OF 34.0 TO 34.9 IN ADULT: ICD-10-CM

## 2025-03-10 DIAGNOSIS — K76.0 HEPATIC STEATOSIS: Primary | ICD-10-CM

## 2025-03-10 DIAGNOSIS — E66.811 CLASS 1 OBESITY DUE TO EXCESS CALORIES WITH SERIOUS COMORBIDITY AND BODY MASS INDEX (BMI) OF 34.0 TO 34.9 IN ADULT: ICD-10-CM

## 2025-03-10 DIAGNOSIS — E55.9 VITAMIN D DEFICIENCY: ICD-10-CM

## 2025-03-10 PROCEDURE — 99999 PR PBB SHADOW E&M-EST. PATIENT-LVL IV: CPT | Mod: PBBFAC,,,

## 2025-03-10 PROCEDURE — 1159F MED LIST DOCD IN RCRD: CPT | Mod: CPTII,S$GLB,,

## 2025-03-10 PROCEDURE — 3008F BODY MASS INDEX DOCD: CPT | Mod: CPTII,S$GLB,,

## 2025-03-10 PROCEDURE — 1160F RVW MEDS BY RX/DR IN RCRD: CPT | Mod: CPTII,S$GLB,,

## 2025-03-10 PROCEDURE — 99203 OFFICE O/P NEW LOW 30 MIN: CPT | Mod: S$GLB,,,

## 2025-03-10 PROCEDURE — 4010F ACE/ARB THERAPY RXD/TAKEN: CPT | Mod: CPTII,S$GLB,,

## 2025-03-10 PROCEDURE — 3044F HG A1C LEVEL LT 7.0%: CPT | Mod: CPTII,S$GLB,,

## 2025-03-10 NOTE — PATIENT INSTRUCTIONS
1. Fibroscan to look for fat or scar tissue in the liver with return to clinic   2. Recommend the Hep A/B combination vaccine if you have not completed it in the past, see below  3. Labs and US this month  4.  Follow up in 3 months with  fibroscan before    FATTY LIVER:  These things are important to improve fatty liver:  Continue no alcohol use   Weight loss goal of 25-35 lbs. ok to use weight loss medications to help with weight loss from a liver standpoint if you don't have any other contraindications   [facility] offers benefits to patients so let me know if you want a referral to the [facility] gym. Also, [facility] has dieticians that can create a weight loss plan. If you are interested let me know and I can place a referral. If so, contact the dietician team at [facility] at email [email or web address] or call [phone].  to get scheduled. They do offer video visits   Avoid processed foods. No fried/fast foods. No sugary drinks or drinks with any calories.   Low carb/sugar and high protein diet (100 grams per day of protein).Try to limit your carb intake to LESS than  grams per day total. Use [employer] Pal or Lose It leonides to add up your carbs through the day. Do NOT drink any beverages with calories or carbs (this can lead to high blood sugar and weight gain). The main thing to focus on is high protein, low carb)  Exercise, 5 days per week, 30 minutes per day, as tolerated  Recommend good cholesterol, blood pressure, blood sugar levels   There is a new medication called Rezdiffra for the treatment of F2-F3 liver scarring due to fatty liver. It is only indicated for patients with significant scar tissue from fatty liver (will discuss after fibroscan)    In some people, fatty liver can progress to steatohepatitis (inflamatory fatty liver) and possibly to cirrhosis, putting one at increased risk for liver cancer, liver failure, and death. Therefore, the  lifestyle changes are very important to decrease this risk.     Helpful website for [facility]/fatty liver: [email or web address]        HEP A/B VACCINE  The CDC recommends that all adults complete the combination Hepatitis A and B vaccine called TwinRix. This will protect your liver from these viruses, which can make your liver very sick.     Hep A can be transmitted through food and water and can cause significant liver injury. There was previously a significant Hepatitis A outbreak in [place]. Hep B vaccine is transmitted through blood or bodily fluids (there are no symptoms typically) and can develop longstanding (chronic) Hep B and there is no cure, so many people have it for life. Therefore, the vaccines provide immunity against these viruses that can cause harm to the liver.     The vaccine series is 3 vaccines: one now, one at 4 weeks and one 6 months after the 1st one. You can get it from any pharmacy but any [facility] pharmacy typically stocks it and administers it frequently      If the vaccine is not covered at the pharmacy level with your insurance, you may be able to get it with your PCP or in the infectious disease department at [facility] or from your [facility].

## 2025-03-10 NOTE — PROGRESS NOTES
[facility] - New Patient    REFERRING PROVIDER:  Dr. Mony Barrios  PCP: Mony Barrios MD     Chief Complaint:  elevated liver enzymes     HISTORY       HPI: This is a 55 y.o. patient with PMH noted below, presenting for evaluation of fatty liver.      No previous serologic w/u.      Interval HPI: Presents today alone. Reports history of fatty liver for many years. Denies any history of liver staging or bx. Denies every seeing hepatology is the past. States that he feels at his age he needs to start taking better care of himself. Has recently started watching what he was eating more and started working out.     Diet: minimum SSB, mostly water, fast/fried food  Exercise: gym 5 days a week  Supplements: none    Risk factors for fatty liver include:     Obesity - current BMI 34.25  HTN - managed with medication  HLD - rosuvastatin 10 mg  PreDM - last A1C 6.3      LABS & DIAGNOSTIC STUDIES      Labs done 2/2025 show elevated transaminase levels (AST > ALT, elevated x1)  Platelets wnl, alk phos wnl  Synthetic liver functioning wnl    Lab Results   Component Value Date    ALT 38 02/27/2025    AST 58 (H) 02/27/2025    ALKPHOS 62 02/27/2025    BILITOT 1.0 02/27/2025    ALBUMIN 4.4 02/27/2025     02/27/2025         Imaging:  Abd CT done 9/2019 noted  Impression    Irregular wall thickening involving the posterior bladder dome and posterior bladder wall. The appearance is concerning for bladder carcinoma. Recommend correlation with cystoscopy.    A Call Report Document Only message has been documented for BRI AHN in the [employer]  Critical Result system on 9/25/2019 9:42 AM CDT, Message ID [id number]. Discussed with Dr. Ahn at approximately 9:42 AM.    Electronically Signed By: Darlin Helton MD 9/25/2019 9:42 AM CDT  Narrative      CT abdomen and pelvis without contrast.  CT abdomen and  pelvis with contrast.    [id number]    Axial images of the abdomen and pelvis were obtained without  contrast.  Next, axial images of the abdomen and pelvis were obtained following IV contrast administration, split bolus technique. Contrast information: Administered 130.0 ml of 350 mg/ml OMNI using Gauge22 via LeftHand. Oral contrast was not administered. Coronal and sagittal reconstructions were provided. Automated exposure control was utilized to reduce radiation dose to the patient. Total DLP was 989.04 mGy.cm.    COMPARISON: March 30, 2012    INDICATION: Flank pain, stone disease suspected    FINDINGS:  Visualized portions of the inferior thorax demonstrate a thin linear scar in the right middle lobe.    ABDOMEN: The liver is steatotic and enlarged measuring approximately 21 cm in craniocaudal dimension. The gallbladder, pancreas, spleen, and adrenal glands are normal. No radiopaque urinary tract calculus is identified. There is a 2.8 cm left mid renal cyst and a few additional tiny bilateral renal hypodensities which are too small to accurately characterize but likely represent additional cysts. The kidneys excrete contrast symmetrically with no focal filling defect identified within the opacified portions of either collecting system. No abdominal adenopathy.    Pelvis: On unopacified bowel loops demonstrate no CT evidence of high-grade bowel obstruction. The appendix is normal. The posterior bladder dome and posterior bladder wall demonstrate irregular wall thickening. There is minimal stranding in the perivesicular fat. No associated ureterectasis. No pelvic or inguinal adenopathy. The prostate is enlarged measuring approximately 5.5 cm in transverse dimension. There are degenerative changes in the lower thoracic and lumbar spine.      Liver fibrosis staging:  -- fibroscan - will obtain        Denies family history of liver disease.     Denies significant alcohol consumption currently or in the past.     Immunity to Hep A and B - will check with next labs          Allergy and medication list reviewed and  "updated     PMHX:  has a past medical history of Asthma, GERD (gastroesophageal reflux disease), Hyperlipidemia, and Hypertension.    PSHX:  has no past surgical history on file.    FAMILY HISTORY: Updated and reviewed in EPIC    ROS:   GENERAL: Denies fatigue  CARDIOVASCULAR: Denies edema  GI: Denies abdominal pain  SKIN: Denies rash, itching   NEURO: Denies confusion, memory loss, or mood changes    PHYSICAL EXAM:   In no acute distress; alert and oriented to person, place and time  VITALS: Ht 5' 9" (1.753 m)   Wt 105.2 kg (231 lb 14.8 oz)   BMI 34.25 kg/m²   EYES: Sclerae anicteric  GI: Soft, non-tender, non-distended. No ascites.  EXTREMITIES:  No edema.  SKIN: Warm and dry. No jaundice. No telangectasias noted. No palmar erythema.  NEURO:  No asterixis.  PSYCH:  Thought and speech pattern appropriate. Behavior normal        ASSESSMENT & PLAN        EDUCATION:  See instructions discussed with patient in Instructions section of the After Visit Summary     ASSESSMENT & PLAN:  55 y.o. male with:  1.  Fatty liver, likely related to metabolic risk factors Obesity, HTN, HLD, PreDM   - see HPI  -- Immunity to Hep A and B : see HPI  -- no recent abdominal imaging - will get updated US  -- Fibroscan with RTC  -- Recommendations discussed with patient:  Continue no alcohol use  2. Weight loss goal of 25-35 lbs  3. Low carb/sugar, high fiber and protein diet, limit your carb intake to LESS than 30-45 grams of carbs with a meal or LESS than 5-10 grams with any snack   4. Exercise, 5 days per week, 30 minutes per day, as tolerated  5. Recommend good cholesterol, blood pressure, blood sugar levels   6. There is a new medication called Rezdiffra for the treatment of F2-F3 liver scarring due to fatty liver. It is only indicated for those with stage 2 or 3 scar tissue (will discuss after fibroscan)    2. Elevated liver enzymes  -- will trend labs - if remain elevated will complete full sero work up    3. Obesity, HTN, HLD, " PreDM   -- Body mass index is 34.25 kg/m².   -- increases risk for fatty liver        Follow up in about 3 months (around 6/10/2025). with labs, US, and fibroscan before  Orders Placed This Encounter   Procedures    FibroScan Transplant Hepatology(Vibration Controlled Transient Elastography)    US Abdomen Complete    Hepatic Function Panel    Hepatitis Panel, Acute    CK    Phosphatidylethanol (PETH)        Thank you for allowing me to participate in the care of Miguel KEVYN Quiroz, FNP-C  Nurse Practitioner  [facility]     I spent a total of 30 minutes on the day of the visit.This includes face to face time and non-face to face time preparing to see the patient (eg, review of tests), obtaining and/or reviewing separately obtained history, documenting clinical information in the electronic or other health record, independently interpreting results and communicating results to the patient/family/caregiver, and coordinating care.         CC'ed note to:   Mony Barrios MD

## 2025-03-11 RX ORDER — METFORMIN HYDROCHLORIDE 500 MG/1
500 TABLET ORAL 2 TIMES DAILY WITH MEALS
Qty: 180 TABLET | Refills: 3 | Status: SHIPPED | OUTPATIENT
Start: 2025-03-11 | End: 2026-03-11

## 2025-03-11 RX ORDER — ACETAMINOPHEN 500 MG
2000 TABLET ORAL DAILY
Qty: 90 CAPSULE | Refills: 3 | Status: SHIPPED | OUTPATIENT
Start: 2025-03-11

## 2025-03-11 NOTE — TELEPHONE ENCOUNTER
I sent in metformin and vitamin-D to the pharmacy, please set him up for a virtual in 6 weeks to see how he is doing on the metformin and make a dose adjustment etc.

## 2025-03-13 ENCOUNTER — TELEPHONE (OUTPATIENT)
Dept: UROLOGY | Facility: CLINIC | Age: 55
End: 2025-03-13
Payer: COMMERCIAL

## 2025-03-13 NOTE — TELEPHONE ENCOUNTER
LVM to schedule.  ----- Message from Femi Veengas MD sent at 3/13/2025 11:22 AM CDT -----  All, Please have Mr Nieves see any urology provider for groin pain Shashank brother is a nurse at [employer] and asked us to see himThanks!Sc

## 2025-03-14 ENCOUNTER — CLINICAL SUPPORT (OUTPATIENT)
Dept: PRIMARY CARE CLINIC | Facility: CLINIC | Age: 55
End: 2025-03-14
Payer: COMMERCIAL

## 2025-03-14 ENCOUNTER — TELEPHONE (OUTPATIENT)
Dept: PRIMARY CARE CLINIC | Facility: CLINIC | Age: 55
End: 2025-03-14

## 2025-03-14 VITALS — HEART RATE: 80 BPM | OXYGEN SATURATION: 97 % | DIASTOLIC BLOOD PRESSURE: 80 MMHG | SYSTOLIC BLOOD PRESSURE: 136 MMHG

## 2025-03-14 DIAGNOSIS — Z01.30 BLOOD PRESSURE CHECK: Primary | ICD-10-CM

## 2025-03-14 PROCEDURE — 99999 PR PBB SHADOW E&M-EST. PATIENT-LVL II: CPT | Mod: PBBFAC,,,

## 2025-03-14 NOTE — TELEPHONE ENCOUNTER
Miguel Nieves is here for Blood Pressure check. in person     Manual Blood pressure reading was  136/80, Pulse 80.      Pt's Home blood pressure machine read in office 133/81, Pulse 84.      Diagnosed with Hypertension yes.     Patient took blood pressure medication today yes.  Last dose of blood pressure medication was taken at 6-7 am this morning. Patient took amlodipine-benazepril 10-20mg and hydroCHLOROthiazide 25 MG tablet.      All Medications and OTC medication updated yes     Does patient have record of home blood pressure readings / Blood Pressure Log yes.      **LOG PLACED IN YOUR BOX FOR REVIEW**     Does the pt have any complaints today in regards to their blood pressure medication? no. Patient is asymptomatic.      Were you sitting still for 5-10 minutes prior to taking your Blood pressure? yes      Has your blood pressure monitor ever been checked? yes When was last time we checked your blood pressure monitor? 02/27/25     Updated vitals yes     Follow up date is 04/23/2025.      Dr. Barrios notified.          ** Pt would like recommendations from Dr. Barrios on what type of supplements can he take for increased energy and also a fiber supplement that she recommends. Informed pt that I would inform Dr. Barrios. Pt verbalized understanding**

## 2025-04-22 ENCOUNTER — PATIENT MESSAGE (OUTPATIENT)
Dept: PRIMARY CARE CLINIC | Facility: CLINIC | Age: 55
End: 2025-04-22
Payer: COMMERCIAL

## 2025-04-23 ENCOUNTER — OFFICE VISIT (OUTPATIENT)
Dept: PRIMARY CARE CLINIC | Facility: CLINIC | Age: 55
End: 2025-04-23
Payer: COMMERCIAL

## 2025-04-23 DIAGNOSIS — E66.09 CLASS 1 OBESITY DUE TO EXCESS CALORIES WITH SERIOUS COMORBIDITY AND BODY MASS INDEX (BMI) OF 33.0 TO 33.9 IN ADULT: ICD-10-CM

## 2025-04-23 DIAGNOSIS — R73.03 PREDIABETES: ICD-10-CM

## 2025-04-23 DIAGNOSIS — E55.9 VITAMIN D DEFICIENCY: ICD-10-CM

## 2025-04-23 DIAGNOSIS — K76.0 FATTY LIVER: ICD-10-CM

## 2025-04-23 DIAGNOSIS — I10 ESSENTIAL HYPERTENSION: Primary | ICD-10-CM

## 2025-04-23 DIAGNOSIS — E66.811 CLASS 1 OBESITY DUE TO EXCESS CALORIES WITH SERIOUS COMORBIDITY AND BODY MASS INDEX (BMI) OF 33.0 TO 33.9 IN ADULT: ICD-10-CM

## 2025-04-23 PROCEDURE — 1160F RVW MEDS BY RX/DR IN RCRD: CPT | Mod: CPTII,95,, | Performed by: FAMILY MEDICINE

## 2025-04-23 PROCEDURE — 3044F HG A1C LEVEL LT 7.0%: CPT | Mod: CPTII,95,, | Performed by: FAMILY MEDICINE

## 2025-04-23 PROCEDURE — 98005 SYNCH AUDIO-VIDEO EST LOW 20: CPT | Mod: 95,,, | Performed by: FAMILY MEDICINE

## 2025-04-23 PROCEDURE — 4010F ACE/ARB THERAPY RXD/TAKEN: CPT | Mod: CPTII,95,, | Performed by: FAMILY MEDICINE

## 2025-04-23 PROCEDURE — 1159F MED LIST DOCD IN RCRD: CPT | Mod: CPTII,95,, | Performed by: FAMILY MEDICINE

## 2025-04-23 RX ORDER — MOMETASONE FUROATE 1 MG/G
CREAM TOPICAL
COMMUNITY
Start: 2025-04-14

## 2025-04-23 NOTE — PROGRESS NOTES
"Subjective:       Patient ID: Miguel Nieves is a 55 y.o. male.    Chief Complaint: Follow-up (Pt here for 6 week f/u) and Hypertension    HPI  56 y/o male with HTN, Asthma, HLPD, Vitamin D deficiency, prediabetes is here for HTN follow up.    He is feeling good overall, his weight is down 10 pound since last visit, he is eating healthy, he is doing cardio and weights 3-4 days a week. He denies f/n/v/d/constipation/cp/sob/urinary sx. Takes metformin 1 time daily mainly as he forgets the second dose. Sleeping ok, has sleep visit set for June.    Home bp 117-120's/70-80's     HTN: amlodipine-benazepril 10-20 mg daily, benazepril 10 mg daily additionally, total of 30 mg benazepril daily, HCTZ 25 mg daily  Family hx of early CAD: has appt with Cardiology  HLPD: Crestor 10 mg daily  Prediabetes: metformin 500 mg bid, A1c 6.3 2/2025  Hematuria s/p hx TURBT, path "Cystitis Cystica"/BPH/Prostatitis: following with SHIRLEY Bell, he follows yearly  Vitamin D deficiency: on D3 2,000 IU daily  Proteinuria  Kidney stone: US 2019 R nonobstructive renal stone  Asthma: albuterol prn when he has a cold or in cold weather  Derm following with Dr. Ruiz  GERD: never had EGD, omeprazole pr[place] [address] care everywhere, following with hepatology, fibroscan ordered   Eye exam utd [facility]  Dental utd  Colonoscopy done at [facility]  Dr. Pat 8/2022, had polyps plans to do 8/2025 with [facility]     Review of Systems   Constitutional:  Negative for activity change and unexpected weight change.   HENT:  Negative for hearing loss, rhinorrhea and trouble swallowing.    Eyes:  Negative for discharge and visual disturbance.   Respiratory:  Negative for chest tightness and wheezing.    Cardiovascular:  Negative for chest pain and palpitations.   Gastrointestinal:  Negative for blood in stool, constipation, diarrhea and vomiting.   Endocrine: Negative for polydipsia and polyuria.   Genitourinary:  Negative for difficulty urinating, " hematuria and urgency.   Musculoskeletal:  Negative for arthralgias, joint swelling and neck pain.   Neurological:  Negative for weakness and headaches.   Psychiatric/Behavioral:  Positive for dysphoric mood. Negative for confusion.      see HPI  Objective:      There were no vitals taken for this visit.  Physical Exam  Constitutional:       General: He is not in acute distress.     Appearance: He is well-developed. He is not diaphoretic.   HENT:      Head: Normocephalic and atraumatic.   Pulmonary:      Effort: No respiratory distress.   Neurological:      Mental Status: He is alert and oriented to person, place, and time.         Assessment:       1. Essential hypertension    2. Prediabetes    3. Class 1 obesity due to excess calories with serious comorbidity and body mass index (BMI) of 33.0 to 33.9 in adult    4. Vitamin D deficiency    5. Fatty liver        Plan:   Miguel was seen today for follow-up and hypertension.    Diagnoses and all orders for this visit:    Essential hypertension  -     Comprehensive Metabolic Panel; Future    Prediabetes  -     Hemoglobin A1C; Future    Class 1 obesity due to excess calories with serious comorbidity and body mass index (BMI) of 33.0 to 33.9 in adult    Vitamin D deficiency    Fatty liver    The patient location is: [place]  The chief complaint leading to consultation is: prediabetes, htn    Visit type: audiovisual    Face to Face time with patient: 15 minutes of total time spent on the encounter, which includes face to face time and non-face to face time preparing to see the patient (eg, review of tests), Obtaining and/or reviewing separately obtained history, Documenting clinical information in the electronic or other health record, Independently interpreting results (not separately reported) and communicating results to the patient/family/caregiver, or Care coordination (not separately reported).         Each patient to whom he or she provides medical services by  telemedicine is:  (1) informed of the relationship between the physician and patient and the respective role of any other health care provider with respect to management of the patient; and (2) notified that he or she may decline to receive medical services by telemedicine and may withdraw from such care at any time.    Notes:

## 2025-06-05 ENCOUNTER — OFFICE VISIT (OUTPATIENT)
Dept: SLEEP MEDICINE | Facility: CLINIC | Age: 55
End: 2025-06-05
Payer: COMMERCIAL

## 2025-06-05 VITALS
HEART RATE: 67 BPM | SYSTOLIC BLOOD PRESSURE: 135 MMHG | DIASTOLIC BLOOD PRESSURE: 85 MMHG | WEIGHT: 231.5 LBS | HEIGHT: 69 IN | BODY MASS INDEX: 34.29 KG/M2

## 2025-06-05 DIAGNOSIS — I10 ESSENTIAL HYPERTENSION: ICD-10-CM

## 2025-06-05 DIAGNOSIS — R06.83 SNORING: ICD-10-CM

## 2025-06-05 DIAGNOSIS — R06.81 WITNESSED EPISODE OF APNEA: ICD-10-CM

## 2025-06-05 DIAGNOSIS — G47.00 INSOMNIA, UNSPECIFIED TYPE: Primary | ICD-10-CM

## 2025-06-05 PROCEDURE — 99999 PR PBB SHADOW E&M-EST. PATIENT-LVL IV: CPT | Mod: PBBFAC,,, | Performed by: NURSE PRACTITIONER

## 2025-06-05 RX ORDER — ZOLPIDEM TARTRATE 5 MG/1
5 TABLET ORAL NIGHTLY PRN
Qty: 1 TABLET | Refills: 0 | Status: SHIPPED | OUTPATIENT
Start: 2025-06-05

## 2025-06-09 ENCOUNTER — TELEPHONE (OUTPATIENT)
Dept: NEPHROLOGY | Facility: CLINIC | Age: 55
End: 2025-06-09
Payer: COMMERCIAL

## 2025-06-12 ENCOUNTER — TELEPHONE (OUTPATIENT)
Dept: PRIMARY CARE CLINIC | Facility: CLINIC | Age: 55
End: 2025-06-12
Payer: COMMERCIAL

## 2025-06-12 DIAGNOSIS — R19.7 DIARRHEA, UNSPECIFIED TYPE: Primary | ICD-10-CM

## 2025-06-12 NOTE — TELEPHONE ENCOUNTER
Spoke with pt and he states that last week he ate something that he thinks was  and and his stomach tightened up on him. Pt also reports that after that he had diarrhea for 4 days, and he took imodium and then the diarrhea went away, he has since been eating his fiber and the diarrhea has gotten better, he has been having bowel movements regularly and noticed they are softer.     Pt is not sure if it is stress or work related but he is also having lower back and side pain on the left side that he noticed more last night due to him waking up throughout the night. Pt states it is not worrisome, but his wife thinks its possible gallstone or kidney stones. Pt requesting a referral to see someone in GI to get a second opinion.    Referrals pended.

## 2025-06-12 NOTE — TELEPHONE ENCOUNTER
Of course, I signed the referrals for GI or if he needs an urgent visit was us please have him do so

## 2025-06-12 NOTE — TELEPHONE ENCOUNTER
Copied from CRM #[id number]. Topic: General Inquiry - Patient Advice  >> Jun 12, 2025  9:09 AM Suhail wrote:  .1MEDICALADVICE     Patient is calling for Medical Advice regarding: Patient says that he needs proof of his use with [employer] card. He says he needs proof he used this card during his virtual visit on 4/21. He said he needs to speak to office clinical staff to discuss this. He also says that he is having stomach/abdomen issues he would like to address. Please call patient at number provided.    How long has patient had these symptoms:    Pharmacy name and phone#:    Patient wants a call back or thru [facility], provide patient's call back phone number: Call; [phone]     Comments:    Please advise patient replies from provider may take up to 48 hours.

## 2025-06-13 ENCOUNTER — TELEPHONE (OUTPATIENT)
Dept: SLEEP MEDICINE | Facility: OTHER | Age: 55
End: 2025-06-13
Payer: COMMERCIAL

## 2025-06-16 ENCOUNTER — TELEPHONE (OUTPATIENT)
Dept: SLEEP MEDICINE | Facility: OTHER | Age: 55
End: 2025-06-16
Payer: COMMERCIAL

## 2025-06-19 ENCOUNTER — TELEPHONE (OUTPATIENT)
Dept: SLEEP MEDICINE | Facility: OTHER | Age: 55
End: 2025-06-19
Payer: COMMERCIAL

## 2025-06-24 ENCOUNTER — TELEPHONE (OUTPATIENT)
Dept: HEPATOLOGY | Facility: CLINIC | Age: 55
End: 2025-06-24
Payer: COMMERCIAL

## 2025-06-24 NOTE — TELEPHONE ENCOUNTER
Received secure chat from provider,asking to reach out to the pt to his no show labs and u/s.I called pt no aswer,I left a voicemail to informed we need top reschedule labs and u/s prior to his follow up visit.Scheduled and call us back if theres any questions.

## 2025-09-02 ENCOUNTER — TELEPHONE (OUTPATIENT)
Dept: ENDOSCOPY | Facility: HOSPITAL | Age: 55
End: 2025-09-02
Payer: COMMERCIAL

## 2025-09-02 VITALS — BODY MASS INDEX: 32.58 KG/M2 | HEIGHT: 69 IN | WEIGHT: 220 LBS

## 2025-09-02 DIAGNOSIS — Z86.0100 HX OF COLONIC POLYPS: Primary | ICD-10-CM

## 2025-09-02 DIAGNOSIS — Z12.11 SPECIAL SCREENING FOR MALIGNANT NEOPLASMS, COLON: ICD-10-CM

## 2025-09-02 RX ORDER — SODIUM, POTASSIUM,MAG SULFATES 17.5-3.13G
1 SOLUTION, RECONSTITUTED, ORAL ORAL DAILY
Qty: 1 KIT | Refills: 0 | Status: SHIPPED | OUTPATIENT
Start: 2025-09-02 | End: 2025-09-04